# Patient Record
Sex: MALE | Race: WHITE | NOT HISPANIC OR LATINO | Employment: FULL TIME | URBAN - NONMETROPOLITAN AREA
[De-identification: names, ages, dates, MRNs, and addresses within clinical notes are randomized per-mention and may not be internally consistent; named-entity substitution may affect disease eponyms.]

---

## 2019-03-19 ENCOUNTER — OFFICE VISIT (OUTPATIENT)
Dept: SLEEP MEDICINE | Facility: HOSPITAL | Age: 54
End: 2019-03-19

## 2019-03-19 VITALS
SYSTOLIC BLOOD PRESSURE: 155 MMHG | HEIGHT: 69 IN | DIASTOLIC BLOOD PRESSURE: 94 MMHG | WEIGHT: 239 LBS | BODY MASS INDEX: 35.4 KG/M2 | HEART RATE: 82 BPM | OXYGEN SATURATION: 98 %

## 2019-03-19 DIAGNOSIS — G47.33 OBSTRUCTIVE SLEEP APNEA, ADULT: Primary | ICD-10-CM

## 2019-03-19 PROCEDURE — 99203 OFFICE O/P NEW LOW 30 MIN: CPT | Performed by: INTERNAL MEDICINE

## 2019-03-19 NOTE — PROGRESS NOTES
New Patient Sleep Medicine Consultation    Encounter Date: 3/19/2019         Patient's PCP: Anitra Begum APRN  Referring provider: No ref. provider found  Reason for consultation chief complaint: snoring, excessive daytime sleepiness and unrefreshing sleep    Everette Mckeon is a 53 y.o. male who admits to snoring, unrestful sleep, High blod pressure, working night shift or rotataing shifts, stop breathing during sleep, sleeping less than 6 hours per night, Disturbed or restless sleep, Up to the bathroom at night, sleepy driving, restless legs at night, difficulty falling asleep and difficulty staying asleep. He denies cataplexy, sleep paralysis, or hypnagogic hallucinations. His bedtime is ~ 1100. He  falls asleep after 1-2 minutes, and is up 4-5 times per night. He wakes up ~ 1800. He endorses 3-4 hours of sleep. He drinks 1 cups of coffee, 2 teas, and 1 sodas per day. He drinks 0 alcoholic beverages per week. He is not a current smoker. He does not take sedatives or hypnotics. He has some sleepiness with driving. He naps rarely.     [place] - 13    No past medical history on file. 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, GERD  Social History     Socioeconomic History   • Marital status:      Spouse name: Not on file   • Number of children: Not on file   • Years of education: Not on file   • Highest education level: Not on file   Social Needs   • Financial resource strain: Not on file   • Food insecurity - worry: Not on file   • Food insecurity - inability: Not on file   • Transportation needs - medical: Not on file   • Transportation needs - non-medical: Not on file   Occupational History   • Not on file   Tobacco Use   • Smoking status: Never Smoker   • Smokeless tobacco: Never Used   Substance and Sexual Activity   • Alcohol use: No     Frequency: Never   • Drug use: No   • Sexual activity: Not on file   Other Topics Concern   • Not on file   Social History Narrative   • Not  "on file     Family History   Problem Relation Age of Onset   • Hypertension Mother    • Hypertension Father    • Hypertension Brother    , 1 child   - straight 3rd shift  1 brothers and 1 sisters  Other family history + for: HTN  Smoking history: smoked never  FH of sleep disorders: father    Review of Systems:  Constitutional: negative  Eyes: negative  Ears, nose, mouth, throat, and face: negative  Respiratory: negative  Cardiovascular: negative  Gastrointestinal: negative  Genitourinary:negative  Integument/breast: negative  Hematologic/lymphatic: negative  Musculoskeletal:negative  Neurological: negative  Behavioral/Psych: negative  Endocrine: negative  Allergic/Immunologic: negative Patient advised to discuss any positive ROS with PCP.      Vitals:    03/19/19 0802   BP: 155/94   Pulse: 82   SpO2: 98%           03/19/19  0802   Weight: 108 kg (239 lb)       Body mass index is 35.29 kg/m². Patient's Body mass index is 35.29 kg/m². BMI is above normal parameters. Recommendations include: referral to primary care.  Neck circumference: 16.5\"          General: Alert. Cooperative. Well developed. No acute distress.             Head:  Normocephalic. Symmetrical. Atraumatic.              Eyes: Sclera clear. No icterus. PERRLA. Normal EOM.             Ears: No deformities. Normal hearing.             Nose: No septal deviation. No drainage.          Throat: No oral lesions. No thrush. Moist mucous membranes. Trachea midline    Tongue is normal    Dentition is fair with narrow bite, crowding, and torus on mandible under the tongue       Pharynx: Posterior pharyngeal pillars are narrow    Mallampati score of III (soft and hard palate and base of uvula visible)    Pharynx is normal with unrermarkable tonsils   Chest Wall:  Normal shape. Symmetric expansion with respiration. No tenderness.          Lungs:  Clear to auscultation bilaterally. No wheezes. No rhonchi. No rales. Respirations regular, even and " unlabored.            Heart:  Regular rhythm and normal rate. Normal S1 and S2. No murmur.     Abdomen:  Soft, non-tender and non-distended. Normal bowel sounds. No masses.  Extremities:  Moves all extremities well. No edema.           Pulses: Pulses palpable and equal bilaterally.               Skin: Dry. Intact. No bleeding. No rash.           Neuro: Moves all 4 extremities and cranial nerves grossly intact.  Psychiatric: Normal mood and affect.    No current outpatient medications on file.    WBC   Date Value Ref Range Status   09/26/2014 12.3 (H) 3.2 - 9.8 x1000/uL Final     RBC   Date Value Ref Range Status   09/26/2014 4.68 4.37 - 5.74 christiano/mm3 Final     Hemoglobin   Date Value Ref Range Status   09/26/2014 13.7 13.7 - 17.3 gm/dl Final     Hematocrit   Date Value Ref Range Status   09/26/2014 41.4 39.0 - 49.0 % Final     MCV   Date Value Ref Range Status   09/26/2014 88.5 80.0 - 98.0 fl Final     MCH   Date Value Ref Range Status   09/26/2014 29.3 26.0 - 34.0 pg Final     MCHC   Date Value Ref Range Status   09/26/2014 33.1 31.5 - 36.3 gm/dl Final     RDW   Date Value Ref Range Status   09/26/2014 12.9 11.5 - 14.5 % Final     MPV   Date Value Ref Range Status   09/26/2014 12.1 (H) 8.0 - 12.0 fl Final     Platelets   Date Value Ref Range Status   09/26/2014 193 150 - 450 x1000/mm3 Final     Neutrophil Rel %   Date Value Ref Range Status   09/26/2014 82.5 (H) 37.0 - 80.0 % Final     Lymphocyte Rel %   Date Value Ref Range Status   09/26/2014 10.0 10.0 - 50.0 % Final     Monocyte Rel %   Date Value Ref Range Status   09/26/2014 6.6 0.0 - 12.0 % Final     Eosinophil Rel %   Date Value Ref Range Status   09/26/2014 0.6 0.0 - 7.0 % Final     Basophil Rel %   Date Value Ref Range Status   09/26/2014 0.1 0.0 - 2.0 % Final     Immature Granulocyte Rel %   Date Value Ref Range Status   09/26/2014 0.20 0.00 - 0.50 % Final     Neutrophils Absolute   Date Value Ref Range Status   09/26/2014 10.15 (H) 2.00 - 8.60 x1000/uL  Final     Lymphocytes Absolute   Date Value Ref Range Status   09/26/2014 1.23 0.60 - 4.20 x1000/uL Final     Monocytes Absolute   Date Value Ref Range Status   09/26/2014 0.81 0.00 - 0.90 x1000/uL Final     Eosinophils Absolute   Date Value Ref Range Status   09/26/2014 0.07 0.00 - 0.70 x1000/uL Final     Basophils Absolute   Date Value Ref Range Status   09/26/2014 0.01 0.00 - 0.20 x1000/uL Final     Immature Granulocytes Absolute   Date Value Ref Range Status   09/26/2014 0.030 (H) 0.005 - 0.022 x1000/uL Final     Contraindications to home sleep test: none    ASSESSMENT:  1. Obstructive sleep apnea New, further workup planned (4)  1. Check home sleep study   2. Restless sleep  3. Shift Work Sleep Disorder Established, stable (1)   1. Discussed with patient    RTC in 3 months         This document has been electronically signed by Yovani Quinonez MD on March 19, 2019         CC: Anitra Begum APRN          No ref. provider found

## 2019-04-01 ENCOUNTER — HOSPITAL ENCOUNTER (OUTPATIENT)
Dept: SLEEP MEDICINE | Facility: HOSPITAL | Age: 54
Discharge: HOME | End: 2019-04-01
Admitting: INTERNAL MEDICINE

## 2019-04-01 DIAGNOSIS — G47.33 OBSTRUCTIVE SLEEP APNEA, ADULT: ICD-10-CM

## 2019-04-01 PROCEDURE — 95806 SLEEP STUDY UNATT&RESP EFFT: CPT

## 2019-04-01 PROCEDURE — 95806 SLEEP STUDY UNATT&RESP EFFT: CPT | Performed by: INTERNAL MEDICINE

## 2019-04-13 DIAGNOSIS — G47.33 OBSTRUCTIVE SLEEP APNEA, ADULT: Primary | ICD-10-CM

## 2019-04-23 ENCOUNTER — HOSPITAL ENCOUNTER (OUTPATIENT)
Dept: SLEEP MEDICINE | Facility: HOSPITAL | Age: 54
Discharge: HOME | End: 2019-04-23
Admitting: INTERNAL MEDICINE

## 2019-04-23 VITALS — BODY MASS INDEX: 35.55 KG/M2 | WEIGHT: 240 LBS | HEIGHT: 69 IN

## 2019-04-23 DIAGNOSIS — G47.33 OBSTRUCTIVE SLEEP APNEA, ADULT: ICD-10-CM

## 2019-04-23 PROCEDURE — 95811 POLYSOM 6/>YRS CPAP 4/> PARM: CPT | Performed by: INTERNAL MEDICINE

## 2019-04-23 PROCEDURE — 95811 POLYSOM 6/>YRS CPAP 4/> PARM: CPT

## 2019-05-02 DIAGNOSIS — G47.33 OBSTRUCTIVE SLEEP APNEA, ADULT: Primary | ICD-10-CM

## 2021-03-26 DIAGNOSIS — M25.551 RIGHT HIP PAIN: Primary | ICD-10-CM

## 2021-03-29 ENCOUNTER — OFFICE VISIT (OUTPATIENT)
Dept: ORTHOPEDIC SURGERY | Facility: CLINIC | Age: 56
End: 2021-03-29

## 2021-03-29 VITALS — HEIGHT: 69 IN | WEIGHT: 239 LBS | BODY MASS INDEX: 35.4 KG/M2

## 2021-03-29 DIAGNOSIS — M25.551 RIGHT HIP PAIN: Primary | ICD-10-CM

## 2021-03-29 DIAGNOSIS — M16.11 PRIMARY OSTEOARTHRITIS OF RIGHT HIP: ICD-10-CM

## 2021-03-29 PROCEDURE — 96372 THER/PROPH/DIAG INJ SC/IM: CPT | Performed by: NURSE PRACTITIONER

## 2021-03-29 PROCEDURE — 99203 OFFICE O/P NEW LOW 30 MIN: CPT | Performed by: NURSE PRACTITIONER

## 2021-03-29 RX ORDER — KETOROLAC TROMETHAMINE 30 MG/ML
60 INJECTION, SOLUTION INTRAMUSCULAR; INTRAVENOUS EVERY 6 HOURS PRN
Status: SHIPPED | OUTPATIENT
Start: 2021-03-29 | End: 2021-04-03

## 2021-03-29 RX ORDER — TRIAMCINOLONE ACETONIDE 40 MG/ML
80 INJECTION, SUSPENSION INTRA-ARTICULAR; INTRAMUSCULAR ONCE
Status: COMPLETED | OUTPATIENT
Start: 2021-03-29 | End: 2021-03-29

## 2021-03-29 RX ADMIN — TRIAMCINOLONE ACETONIDE 80 MG: 40 INJECTION, SUSPENSION INTRA-ARTICULAR; INTRAMUSCULAR at 15:49

## 2021-03-29 RX ADMIN — KETOROLAC TROMETHAMINE 60 MG: 30 INJECTION, SOLUTION INTRAMUSCULAR; INTRAVENOUS at 15:51

## 2021-03-29 NOTE — PROGRESS NOTES
Everette Mckeon is a 55 y.o. male   Primary provider:  Provider, No Known       Chief Complaint   Patient presents with   • Right Hip - Hip Pain     HISTORY OF PRESENT ILLNESS:    55-year-old male patient presents to office for evaluation of chronic right hip pain.  Onset of pain occurred over 1 year ago with no known injury or incident.  Patient states his pain has continued to progressively worsen.  Patient reports pain with his hip flexed in certain positions while sitting as well as increased pain with longer periods of standing and walking.  Patient works as a .  Pain is described as constant and moderate in severity.  Pain is described as aching in nature.  Pain is worse with sitting, driving and walking.  Patient reports difficulty putting on his socks due to pain and limitations in the right hip joint.  Pain improves mildly with rest, heat therapy and Ibuprofen.  X-rays are performed in office today.  Pain scale today is 3/10.    Hip Pain   The incident occurred more than 1 week ago (about one year ago). There was no injury mechanism. The pain is present in the right hip. The quality of the pain is described as aching. The pain is at a severity of 3/10. The pain is moderate. The pain has been constant since onset. Pertinent negatives include no numbness or tingling. He reports no foreign bodies present. The symptoms are aggravated by weight bearing and movement (sitting, driving, walking). He has tried NSAIDs, heat and rest (xrays done today. ) for the symptoms. The treatment provided mild relief.     CONCURRENT MEDICAL HISTORY:    Past Medical History:   Diagnosis Date   • History of stomach ulcers    • Primary osteoarthritis of right hip 3/29/2021   • Sleep apnea        No Known Allergies      Current Outpatient Medications:   •  diclofenac sodium (VOTAREN XR) 100 MG 24 hr tablet, Take 1 tablet by mouth Daily., Disp: 30 tablet, Rfl: 3    Current Facility-Administered Medications:   •   "ketorolac (TORADOL) injection 60 mg, 60 mg, Intramuscular, Q6H PRN, Fela Kelly, APRN, 60 mg at 03/29/21 1551    Past Surgical History:   Procedure Laterality Date   • GALLBLADDER SURGERY      9-10 years ago   • VASECTOMY      20 years ago       Family History   Problem Relation Age of Onset   • Hypertension Mother    • Hypertension Father    • Hypertension Brother        Social History     Socioeconomic History   • Marital status:      Spouse name: Not on file   • Number of children: Not on file   • Years of education: Not on file   • Highest education level: Not on file   Tobacco Use   • Smoking status: Never Smoker   • Smokeless tobacco: Never Used   Substance and Sexual Activity   • Alcohol use: No   • Drug use: No        Review of Systems   Constitutional: Negative.    HENT: Negative.    Eyes: Negative.    Respiratory: Negative.    Cardiovascular: Negative.    Gastrointestinal: Negative.    Endocrine: Negative.    Genitourinary: Negative.    Musculoskeletal: Positive for arthralgias and gait problem.        Right hip pain.    Skin: Negative.    Allergic/Immunologic: Negative.    Neurological: Negative for tingling and numbness.   Hematological: Negative.    Psychiatric/Behavioral: Negative.        PHYSICAL EXAMINATION:       Ht 175.3 cm (69\")   Wt 108 kg (239 lb)   BMI 35.29 kg/m²     Physical Exam  Vitals reviewed.   Constitutional:       General: He is not in acute distress.     Appearance: He is well-developed. He is not ill-appearing.   HENT:      Head: Normocephalic.   Pulmonary:      Effort: Pulmonary effort is normal. No respiratory distress.   Abdominal:      General: There is no distension.      Palpations: Abdomen is soft.   Musculoskeletal:         General: No swelling, tenderness, deformity or signs of injury.   Skin:     General: Skin is warm and dry.      Capillary Refill: Capillary refill takes less than 2 seconds.      Findings: No erythema.   Neurological:      Mental Status: He is " alert and oriented to person, place, and time.      GCS: GCS eye subscore is 4. GCS verbal subscore is 5. GCS motor subscore is 6.   Psychiatric:         Speech: Speech normal.         Behavior: Behavior normal.         Thought Content: Thought content normal.         Judgment: Judgment normal.         GAIT:     []  Normal  [x]  Antalgic (mild)    Assistive device: [x]  None  []  Walker     []  Crutches  []  Cane     []  Wheelchair  []  Stretcher    Right Hip Exam     Tenderness   The patient is experiencing no tenderness.     Range of Motion   Abduction: 30   Flexion: 100   External rotation: 50   Internal rotation: 5     Muscle Strength   The patient has normal right hip strength.    Tests   DEE: positive  Fadir:  Positive FADIR test    Other   Erythema: absent  Sensation: normal  Pulse: present    Comments:  Pain and limitations with range of motion.  No deformity.  No leg length discrepancy noted.  No tenderness to palpation.  No swelling appreciated.  No erythema.  No warmth.  No signs of infection noted.      Left Hip Exam     Tenderness   The patient is experiencing no tenderness.     Range of Motion   The patient has normal left hip ROM.    Muscle Strength   The patient has normal left hip strength.     Tests   DEE: negative  Fadir:  Negative FADIR test    Other   Erythema: absent  Sensation: normal  Pulse: present            XR Hip With or Without Pelvis 2 - 3 View Right    Result Date: 3/29/2021  Narrative: AP and lateral views of the right hip with an AP view of the pelvis reveal no evidence of acute fracture or dislocation.  There are moderate to severe degenerative changes noted in the right hip joint with diminished joint spacing.  There are subchondral sclerotic changes seen inferiorly at the humeral head and acetabular rim.  There is spurring seen at the inferior aspect of the humeral head.  There is some mild irregularity noted at the inferior aspect of the humeral head but overall remains round  in appearance and well-positioned within the acetabulum.  Joint spacing is well-maintained in the left hip as seen on the AP view of the pelvis with no degenerative changes noted on the left side.  The bony pelvis is intact.  The SI joints are intact and appear symmetrical.  No acute bony radiologic abnormalities are noted at this time.  No comparison images are available for review.03/29/21 at 16:53 CDT by AGNES Hare         ASSESSMENT:    Diagnoses and all orders for this visit:    Right hip pain  -     ketorolac (TORADOL) injection 60 mg  -     triamcinolone acetonide (KENALOG-40) injection 80 mg    Primary osteoarthritis of right hip  -     ketorolac (TORADOL) injection 60 mg  -     triamcinolone acetonide (KENALOG-40) injection 80 mg    Other orders  -     diclofenac sodium (VOTAREN XR) 100 MG 24 hr tablet; Take 1 tablet by mouth Daily.    PLAN    X-rays of the right hip/pelvis performed in office today reviewed with no acute findings noted.  Patient is noted to have moderate to severe degenerative changes in the right hip joint and we have reviewed these x-rays together today and discussed the results.  Patient has experienced progressively worsening right hip pain over the past year with no known injury or incident.  We discussed the likelihood of eventual need for hip arthroplasty. We discussed treatment options available for surgical consideration.  Recommend IM injections of Toradol and Kenalog today for management of pain/inflammation.  Recommend use of a cane for modified weightbearing off the right hip as needed.  Recommend rest and activity modification during times of increased pain.  We discussed trial of an intra-articular injection of steroid if the intramuscular injections are ineffective.    Recommend starting a prescription oral NSAID for improved control of pain/inflammation. Ibuprofen is discontinued and Voltaren XR is prescribed today. Patient is instructed to take the medication  daily. Patient is instructed to take the medication with food to minimize any potential GI upset. Patient is instructed not to take any additional NSAIDs, such as Ibuprofen or Aleve, while taking Voltaren. Patient can also take Tylenol as needed for additional pain control.  Patient can also take Tylenol as needed for additional pain control.  Follow-up in 6 weeks for recheck as needed for any new, worsening or persistent symptoms.  We also discussed that the patient can call the office and notify me if he decides he wants to move forward with trial of an intra-articular injection of steroid, certainly if he does not get sufficient relief with the IM Kenalog a prescription oral NSAID.    EMR Dragon/Transciption Disclaimer: Some of this note may be an electronic transcription/translation of spoken language to printed text.  The electronic translation of spoken language may permit erroneous, or at times, nonsensical words or phrases to be inadvertently transcribed. Although I have reviewed the note for such errors, some may still exist.     Return in about 6 weeks (around 5/10/2021), or if symptoms worsen or fail to improve, for Recheck.        This document has been electronically signed by AGNES Hare on March 30, 2021 21:17 CDT      AGNES Hare

## 2021-08-13 ENCOUNTER — OFFICE VISIT (OUTPATIENT)
Dept: ORTHOPEDIC SURGERY | Facility: CLINIC | Age: 56
End: 2021-08-13

## 2021-08-13 VITALS — WEIGHT: 239 LBS | HEIGHT: 69 IN | BODY MASS INDEX: 35.4 KG/M2

## 2021-08-13 DIAGNOSIS — M25.551 RIGHT HIP PAIN: Primary | ICD-10-CM

## 2021-08-13 DIAGNOSIS — M16.11 PRIMARY OSTEOARTHRITIS OF RIGHT HIP: ICD-10-CM

## 2021-08-13 PROCEDURE — 99213 OFFICE O/P EST LOW 20 MIN: CPT | Performed by: NURSE PRACTITIONER

## 2021-08-13 PROCEDURE — 96372 THER/PROPH/DIAG INJ SC/IM: CPT | Performed by: NURSE PRACTITIONER

## 2021-08-13 RX ORDER — KETOROLAC TROMETHAMINE 30 MG/ML
60 INJECTION, SOLUTION INTRAMUSCULAR; INTRAVENOUS ONCE
Status: COMPLETED | OUTPATIENT
Start: 2021-08-13 | End: 2021-08-13

## 2021-08-13 RX ORDER — TRIAMCINOLONE ACETONIDE 40 MG/ML
80 INJECTION, SUSPENSION INTRA-ARTICULAR; INTRAMUSCULAR ONCE
Status: COMPLETED | OUTPATIENT
Start: 2021-08-13 | End: 2021-08-13

## 2021-08-13 RX ADMIN — KETOROLAC TROMETHAMINE 60 MG: 30 INJECTION, SOLUTION INTRAMUSCULAR; INTRAVENOUS at 14:13

## 2021-08-13 RX ADMIN — TRIAMCINOLONE ACETONIDE 80 MG: 40 INJECTION, SUSPENSION INTRA-ARTICULAR; INTRAMUSCULAR at 14:13

## 2021-08-13 NOTE — PROGRESS NOTES
"Everette Mckeon is a 55 y.o. male returns for     Chief Complaint   Patient presents with   • Right Hip - Follow-up       HISTORY OF PRESENT ILLNESS: Patient presents to office for follow-up of chronic right hip pain. Onset of pain occurred about 1-1/2 years ago with no known injury or incident. Patient was previously diagnosed with osteoarthritis of the right hip based on x-ray imaging, which showed moderate to severe degenerative changes and loss of joint spacing. Patient has experienced right hip/groin pain that has continued to progressively worsen. Patient was last seen in office on 3/29/2021 and given IM injections of Toradol and Kenalog, which she states offered significant pain improvement for several months. Patient was also started on Voltaren XR, which he states has also been beneficial. Patient continues to take Voltaren and is tolerating this medication well with no known adverse effects. Patient reports some gradual return/increase in his right hip pain in recent weeks with no known injury or incident. Patient has increased pain with longer periods of standing and walking. 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 Patient states he has an upcoming vacation and wants to repeat the injections today in an effort to improve his pain as he knows he will be doing more walking on vacation.     CONCURRENT MEDICAL HISTORY:    The following portions of the patient's history were reviewed and updated as appropriate: allergies, current medications, past family history, past medical history, past social history, past surgical history and problem list.     ROS  No fevers or chills.  No chest pain or shortness of air.  No GI or  disturbances. Right hip pain.     PHYSICAL EXAMINATION:       Ht 175.3 cm (69\")   Wt 108 kg (239 lb)   BMI 35.29 kg/m²     Physical Exam  Vitals reviewed. "   Constitutional:       General: He is not in acute distress.     Appearance: He is well-developed. He is not ill-appearing.   HENT:      Head: Normocephalic.   Pulmonary:      Effort: Pulmonary effort is normal. No respiratory distress.   Abdominal:      General: There is no distension.      Palpations: Abdomen is soft.   Musculoskeletal:         General: No swelling, tenderness, deformity or signs of injury.   Skin:     General: Skin is warm and dry.      Capillary Refill: Capillary refill takes less than 2 seconds.      Findings: No erythema.   Neurological:      Mental Status: He is alert and oriented to person, place, and time.      GCS: GCS eye subscore is 4. GCS verbal subscore is 5. GCS motor subscore is 6.   Psychiatric:         Speech: Speech normal.         Behavior: Behavior normal.         Thought Content: Thought content normal.         Judgment: Judgment normal.         GAIT:     [x]  Normal  []  Antalgic    Assistive device: [x]  None  []  Walker     []  Crutches  []  Cane     []  Wheelchair  []  Stretcher    Right Hip Exam     Tenderness   The patient is experiencing no tenderness.     Range of Motion   Abduction: 30   Flexion: 100   External rotation: 50   Internal rotation: 5     Muscle Strength   The patient has normal right hip strength.    Tests   DEE: positive  Fadir:  Positive FADIR test    Other   Erythema: absent  Sensation: normal  Pulse: present    Comments:  Pain and limitations with range of motion.  No deformity.  No leg length discrepancy noted.  No tenderness to palpation.  No swelling appreciated.  No erythema.  No warmth.  No signs of infection noted.            XR Hip With or Without Pelvis 2 - 3 View Right     Result Date: 3/29/2021  Narrative: AP and lateral views of the right hip with an AP view of the pelvis reveal no evidence of acute fracture or dislocation.  There are moderate to severe degenerative changes noted in the right hip joint with diminished joint spacing.   There are subchondral sclerotic changes seen inferiorly at the humeral head and acetabular rim.  There is spurring seen at the inferior aspect of the humeral head.  There is some mild irregularity noted at the inferior aspect of the humeral head but overall remains round in appearance and well-positioned within the acetabulum.  Joint spacing is well-maintained in the left hip as seen on the AP view of the pelvis with no degenerative changes noted on the left side.  The bony pelvis is intact.  The SI joints are intact and appear symmetrical.  No acute bony radiologic abnormalities are noted at this time.  No comparison images are available for review.03/29/21 at 16:53 CDT by AGNES Hare       ASSESSMENT:    Diagnoses and all orders for this visit:    Right hip pain  -     triamcinolone acetonide (KENALOG-40) injection 80 mg  -     ketorolac (TORADOL) injection 60 mg    Primary osteoarthritis of right hip  -     triamcinolone acetonide (KENALOG-40) injection 80 mg  -     ketorolac (TORADOL) injection 60 mg    PLAN    Patient is doing well overall and reports significant improvement following IM injections of Toradol and Kenalog at last office visit in March 2021 and starting a prescription oral NSAID. Patient has known moderate to severe osteoarthritic changes in the right hip joint. Patient has an upcoming vacation and is concerned that his right hip pain will increase even further with longer periods of weightbearing activity and walking. Recommend repeat IM injections today of Toradol and Kenalog. We discussed that if these do not offer sufficient pain relief, patient can notify me and I will order an intra-articular injection of steroid, which would be intended to relieve pain/inflammation related to osteoarthritis in his right hip joint. Patient had good pain improvement previously with intramuscular injection so we will try to repeat these again.    Recommend rest and activity modification during times  of increased pain. Recommend use of a cane for modified weightbearing of the right hip during times of increased pain. Recommend to continue with Voltaren XR daily for consistent dosing of oral NSAIDs. Patient is tolerating this medication well with no known adverse effects. Patient is reminded not to take any additional OTC oral NSAIDs while taking Voltaren. Patient can also take Tylenol as needed for additional pain control. Follow-up in 6 weeks for recheck as needed for any new, worsening or persistent symptoms. We also discussed that the patient can call the office and notify me if he decides he wants to proceed with a trial of an intra-articular injection of steroid into the right hip joint, which he understands will be performed at the hospital by the radiologist.    EMR Dragon/Transciption Disclaimer: Some of this note may be an electronic transcription/translation of spoken language to printed text.  The electronic translation of spoken language may permit erroneous, or at times, nonsensical words or phrases to be inadvertently transcribed. Although I have reviewed the note for such errors, some may still exist.     Return in about 6 weeks (around 9/24/2021) for Recheck.        This document has been electronically signed by AGNES Hare on August 15, 2021 10:36 CDT      AGNES Hare

## 2021-09-24 DIAGNOSIS — M79.605 LEFT LEG PAIN: ICD-10-CM

## 2021-09-24 DIAGNOSIS — M25.562 ACUTE PAIN OF LEFT KNEE: Primary | ICD-10-CM

## 2021-09-24 DIAGNOSIS — M25.552 LEFT HIP PAIN: Primary | ICD-10-CM

## 2021-09-27 ENCOUNTER — OFFICE VISIT (OUTPATIENT)
Dept: ORTHOPEDIC SURGERY | Facility: CLINIC | Age: 56
End: 2021-09-27

## 2021-09-27 VITALS — BODY MASS INDEX: 32.44 KG/M2 | HEIGHT: 69 IN | WEIGHT: 219 LBS

## 2021-09-27 DIAGNOSIS — M79.605 LEFT LEG PAIN: ICD-10-CM

## 2021-09-27 DIAGNOSIS — M41.50 DEGENERATIVE SCOLIOSIS: ICD-10-CM

## 2021-09-27 DIAGNOSIS — R20.0 NUMBNESS AND TINGLING OF LEFT LEG: ICD-10-CM

## 2021-09-27 DIAGNOSIS — M54.16 LUMBAR RADICULOPATHY: ICD-10-CM

## 2021-09-27 DIAGNOSIS — M25.552 LEFT HIP PAIN: Primary | ICD-10-CM

## 2021-09-27 DIAGNOSIS — R20.2 NUMBNESS AND TINGLING OF LEFT LEG: ICD-10-CM

## 2021-09-27 DIAGNOSIS — M51.36 DDD (DEGENERATIVE DISC DISEASE), LUMBAR: ICD-10-CM

## 2021-09-27 PROCEDURE — 99214 OFFICE O/P EST MOD 30 MIN: CPT | Performed by: NURSE PRACTITIONER

## 2021-09-27 RX ORDER — HYDROCODONE BITARTRATE AND ACETAMINOPHEN 5; 325 MG/1; MG/1
1 TABLET ORAL EVERY 8 HOURS PRN
Qty: 30 TABLET | Refills: 0 | Status: SHIPPED | OUTPATIENT
Start: 2021-09-27 | End: 2021-10-07

## 2021-09-27 RX ORDER — METHOCARBAMOL 750 MG/1
750 TABLET, FILM COATED ORAL 4 TIMES DAILY PRN
COMMUNITY
End: 2021-12-04

## 2021-09-27 NOTE — PROGRESS NOTES
Answers for HPI/ROS submitted by the patient on 9/20/2021  What is the primary reason for your visit?: Other  Please describe your symptoms.: Pain in left hip and running down lower left leg  Have you had these symptoms before?: No  How long have you been having these symptoms?: Greater than 2 weeks    Everette Mckeon is a 56 y.o. male   Primary provider:  Provider, No Known       Chief Complaint   Patient presents with   • Left Hip - Hip Pain     HISTORY OF PRESENT ILLNESS:    56-year-old male patient presents to office for evaluation of acute low back and left hip pain with pain that radiates down the length of his left leg to the level of the left foot with numbness and tingling symptoms occurring intermittently.  Onset of pain occurred on 8/20/2021 after bending over at work.  Patient did not sustain any specific injury.  Patient localizes his pain primarily in the left posterior hip and buttock and throughout the length of the left leg.  Patient was initially evaluated at [facility] urgent care in [place] on 8/22/2021 and given IM injections of Toradol and Kenalog and prescribed Zanaflex.  [facility] [place] [facility] performed and prescribed Robaxin and a Medrol Dosepak.  Patient reports that his pain has mildly and gradually been improving.  Patient states his pain was much better yesterday but he experienced pain again today.  Pain is described as constant and moderate in severity.  Pain is described as aching in nature with associated popping sensations and numbness/tingling in the left leg.  Pain is worse with sitting, standing and walking.  Pain improves mildly with rest, muscle relaxants, oral steroids, anti-inflammatory medications and IM injections of Toradol and Kenalog.  Pain scale today is 5/10.    Hip Pain   The incident occurred more than 1 week ago (8/22/2021). There was no injury mechanism. The pain is present in the left hip and  left leg. The quality of the pain is described as aching. The pain is at a severity of 5/10. The pain is moderate. The pain has been constant since onset. Associated symptoms include numbness (LLE). Associated symptoms comments: Popping sensations. He reports no foreign bodies present. The symptoms are aggravated by movement and weight bearing (Sitting, standing, walking). He has tried rest and NSAIDs (IM Toradol, IM Kenalog, muscle relaxants (Robaxin, Zanaflex), a Medrol Dosepak) for the symptoms. The treatment provided mild relief.     CONCURRENT MEDICAL HISTORY:    Past Medical History:   Diagnosis Date   • Degenerative scoliosis    • History of stomach ulcers    • Primary osteoarthritis of right hip 3/29/2021   • Sleep apnea        No Known Allergies      Current Outpatient Medications:   •  diclofenac sodium (VOTAREN XR) 100 MG 24 hr tablet, Take 1 tablet by mouth Daily., Disp: 30 tablet, Rfl: 3  •  methocarbamol (ROBAXIN) 750 MG tablet, Take 750 mg by mouth 4 (Four) Times a Day As Needed for Muscle Spasms., Disp: , Rfl:   •  HYDROcodone-acetaminophen (NORCO) 5-325 MG per tablet, Take 1 tablet by mouth Every 8 (Eight) Hours As Needed for Moderate Pain  or Severe Pain  for up to 10 days., Disp: 30 tablet, Rfl: 0    Past Surgical History:   Procedure Laterality Date   • GALLBLADDER SURGERY      9-10 years ago   • HAND SURGERY  2000    Partial amputation finger   • KNEE SURGERY  1985   • VASECTOMY      20 years ago       Family History   Problem Relation Age of Onset   • Hypertension Mother    • Hypertension Father    • Hypertension Brother        Social History     Socioeconomic History   • Marital status:      Spouse name: Not on file   • Number of children: Not on file   • Years of education: Not on file   • Highest education level: Not on file   Tobacco Use   • Smoking status: Never Smoker   • Smokeless tobacco: Never Used   Substance and Sexual Activity   • Alcohol use: No   • Drug use: No   • Sexual  "activity: Yes     Partners: Female     Comment: Vasectomy        Review of Systems   Constitutional: Positive for activity change.   HENT: Negative.    Eyes: Negative.    Respiratory: Negative.    Cardiovascular: Negative.    Gastrointestinal: Negative.    Endocrine: Negative.    Genitourinary: Negative.    Musculoskeletal: Positive for arthralgias, back pain and gait problem.        Left hip/leg pain.   Skin: Negative.    Allergic/Immunologic: Negative.    Neurological: Positive for numbness (LLE).        Tingling   Hematological: Negative.    Psychiatric/Behavioral: Negative.        PHYSICAL EXAMINATION:       Ht 175.3 cm (69\")   Wt 99.3 kg (219 lb)   BMI 32.34 kg/m²     Physical Exam  Vitals reviewed.   Constitutional:       General: He is not in acute distress.     Appearance: He is well-developed. He is not ill-appearing.   HENT:      Head: Normocephalic.   Pulmonary:      Effort: Pulmonary effort is normal. No respiratory distress.   Abdominal:      General: There is no distension.      Palpations: Abdomen is soft.   Musculoskeletal:         General: Tenderness (Mild, lumbar spine) present. No swelling, deformity or signs of injury.      Lumbar back: Positive left straight leg raise test. Negative right straight leg raise test.   Skin:     General: Skin is warm and dry.      Capillary Refill: Capillary refill takes less than 2 seconds.      Findings: No erythema.   Neurological:      Mental Status: He is alert and oriented to person, place, and time.      GCS: GCS eye subscore is 4. GCS verbal subscore is 5. GCS motor subscore is 6.   Psychiatric:         Speech: Speech normal.         Behavior: Behavior normal.         Thought Content: Thought content normal.         Judgment: Judgment normal.         GAIT:     []  Normal  [x]  Antalgic (mild)    Assistive device: [x]  None  []  Walker     []  Crutches  []  Cane     []  Wheelchair  []  Stretcher    Left Hip Exam     Tenderness   The patient is experiencing " no tenderness.     Range of Motion   The patient has normal left hip ROM.    Muscle Strength   The patient has normal left hip strength.     Tests   DEE: negative  Fadir:  Negative FADIR test    Other   Erythema: absent  Sensation: normal  Pulse: present      Back Exam     Tenderness   The patient is experiencing tenderness in the lumbar (Mild).    Range of Motion   Extension: abnormal   Flexion: abnormal   Rotation right: abnormal   Rotation left: abnormal     Muscle Strength   The patient has normal back strength.    Tests   Straight leg raise right: negative  Straight leg raise left: positive at 80 deg    Reflexes   Patellar: normal    Other   Sensation: decreased  Gait: antalgic (Mild)   Erythema: no back redness            XR LUMBAR SPINE MIN 4 VIEWS    Narrative    X-ray of the lumbar spine taken 9/18/2021 show degenerative scoliosis. He does have good disc space height through the lumbar vertebra until we get to L5-S1 and we see very narrowed disc space.      Date: 09/23/21   Received From: [facility]        XR Hip With or Without Pelvis 2 - 3 View Left    Result Date: 9/27/2021  Narrative: Two view left hip with single view pelvis HISTORY: Left hip pain AP and frog-leg lateral views of the left hip and AP film of the pelvis obtained. COMPARISON: Pelvis March 29, 2021 FINDINGS: No fracture or dislocation. Normal left hip. Degenerative changes right hip with some joint space narrowing and small osteophytes. Mild degenerative change each iliac crest. Degenerative disc disease L3-4. No other osseous or articular abnormality.     Impression: CONCLUSION: Normal left hip. Degenerative changes right hip with some joint space narrowing and small osteophytes. Mild degenerative change each iliac crest. Degenerative disc disease L3-4. [id number] Electronically signed by:  Eloy Gregg MD  9/27/2021 11:00 PM CDT Workstation: [employer]        ASSESSMENT:    Diagnoses and all orders for this visit:    Left  hip pain  -     HYDROcodone-acetaminophen (NORCO) 5-325 MG per tablet; Take 1 tablet by mouth Every 8 (Eight) Hours As Needed for Moderate Pain  or Severe Pain  for up to 10 days.  -     MRI Lumbar Spine Without Contrast; Future    Lumbar radiculopathy  -     HYDROcodone-acetaminophen (NORCO) 5-325 MG per tablet; Take 1 tablet by mouth Every 8 (Eight) Hours As Needed for Moderate Pain  or Severe Pain  for up to 10 days.  -     MRI Lumbar Spine Without Contrast; Future    Left leg pain  -     HYDROcodone-acetaminophen (NORCO) 5-325 MG per tablet; Take 1 tablet by mouth Every 8 (Eight) Hours As Needed for Moderate Pain  or Severe Pain  for up to 10 days.  -     MRI Lumbar Spine Without Contrast; Future    DDD (degenerative disc disease), lumbar  -     HYDROcodone-acetaminophen (NORCO) 5-325 MG per tablet; Take 1 tablet by mouth Every 8 (Eight) Hours As Needed for Moderate Pain  or Severe Pain  for up to 10 days.  -     MRI Lumbar Spine Without Contrast; Future    Degenerative scoliosis  -     HYDROcodone-acetaminophen (NORCO) 5-325 MG per tablet; Take 1 tablet by mouth Every 8 (Eight) Hours As Needed for Moderate Pain  or Severe Pain  for up to 10 days.  -     MRI Lumbar Spine Without Contrast; Future    Numbness and tingling of left leg  -     MRI Lumbar Spine Without Contrast; Future    PLAN    X-rays of the left hip/pelvis performed in office today reviewed with no acute findings noted.  No degenerative changes are noted in the left hip joint.  There is redemonstration of osteoarthritic changes in the right hip on the pelvic view.  Patient previously had x-rays of the lumbar spine on 9/23/2021 at [facility] [place].  I have reviewed the results of these x-rays today but the images are unavailable for me to review.  Subjective complaints and physical exam are consistent with lumbar radiculopathy.  Onset of pain occurred approximately 3 to 4 weeks ago with no particular injury.  Patient continues to  have persistent symptoms that hav not improved with multiple conservative treatment efforts as described in the HPI above.  Recommend MRI of the lumbar spine to evaluate for disc herniation, spinal stenosis, foraminal stenosis and/or nerve compression.    Recommend the following:    -Rest and activity modification for now with avoidance of straining, lifting, pulling, tugging, etc.  -Use of ice therapy and/or heat therapy to the low back to minimize pain/inflammation/muscle tension.  -Use of a cane or walker for modified weightbearing from the lumbar spine.    Norco is prescribed to take as needed for moderate to severe pain.  Patient is instructed to take the pain medication sparingly and only when needed.Patient is instructed to take the least amount of pain medication needed to control the pain. Patient is cautioned that opioids can be addictive.  We discussed other potential adverse side effects of opioids including constipation, dizziness, drowsiness, increased risk for falls and/or respiratory depression.  Patient verbalized understanding of these risks.  I have encouraged the patient to focus on nonopioid pain management methods as much as possible.  DIAMOND is reviewed internally via Epic and is noted to be appropriate.  Recommend Tylenol as needed for milder pain.  Patient has Voltaren XR to take for management of pain/inflammation that was previously prescribed for his chronic right hip pain.  Patient also has recently prescribed muscle relaxants to take at home if these offer him any improvement in pain/muscle spasms.  No additional steroids are administered today as the patient has had recent IM injection of Kenalog as well as a Medrol Dosepak.    Follow-up after MRI to discuss results and further treatment recommendations.  Consider trial of a lumbar epidural injection of steroid.  Consider surgical consult/referral to neurosurgery if indicated.  Physical therapy referral would be a treatment option as  well.    EMR Dragon/Transciption Disclaimer: Some of this note may be an electronic transcription/translation of spoken language to printed text.  The electronic translation of spoken language may permit erroneous, or at times, nonsensical words or phrases to be inadvertently transcribed. Although I have reviewed the note for such errors, some may still exist.     Return for follow up after MRI.        This document has been electronically signed by AGNES Hare on September 29, 2021 15:51 CDT      AGNES Hare

## 2021-09-29 PROBLEM — M79.605 LEFT LEG PAIN: Status: ACTIVE | Noted: 2021-09-29

## 2021-09-29 PROBLEM — M54.16 LUMBAR RADICULOPATHY: Status: ACTIVE | Noted: 2021-09-29

## 2021-09-29 PROBLEM — M51.36 DDD (DEGENERATIVE DISC DISEASE), LUMBAR: Status: ACTIVE | Noted: 2021-09-29

## 2021-09-29 PROBLEM — R20.0 NUMBNESS AND TINGLING OF LEFT LEG: Status: ACTIVE | Noted: 2021-09-29

## 2021-09-29 PROBLEM — R20.2 NUMBNESS AND TINGLING OF LEFT LEG: Status: ACTIVE | Noted: 2021-09-29

## 2021-09-29 PROBLEM — M41.50 DEGENERATIVE SCOLIOSIS: Status: ACTIVE | Noted: 2021-09-29

## 2021-10-07 ENCOUNTER — HOSPITAL ENCOUNTER (OUTPATIENT)
Dept: MRI IMAGING | Facility: HOSPITAL | Age: 56
Discharge: HOME | End: 2021-10-07
Admitting: NURSE PRACTITIONER

## 2021-10-07 DIAGNOSIS — M54.16 LUMBAR RADICULOPATHY: ICD-10-CM

## 2021-10-07 DIAGNOSIS — R20.0 NUMBNESS AND TINGLING OF LEFT LEG: ICD-10-CM

## 2021-10-07 DIAGNOSIS — R20.2 NUMBNESS AND TINGLING OF LEFT LEG: ICD-10-CM

## 2021-10-07 DIAGNOSIS — M79.605 LEFT LEG PAIN: ICD-10-CM

## 2021-10-07 DIAGNOSIS — M25.552 LEFT HIP PAIN: ICD-10-CM

## 2021-10-07 DIAGNOSIS — M51.36 DDD (DEGENERATIVE DISC DISEASE), LUMBAR: ICD-10-CM

## 2021-10-07 DIAGNOSIS — M41.50 DEGENERATIVE SCOLIOSIS: ICD-10-CM

## 2021-10-07 PROCEDURE — 72148 MRI LUMBAR SPINE W/O DYE: CPT

## 2021-10-12 ENCOUNTER — OFFICE VISIT (OUTPATIENT)
Dept: SLEEP MEDICINE | Facility: HOSPITAL | Age: 56
End: 2021-10-12

## 2021-10-12 VITALS
BODY MASS INDEX: 32.58 KG/M2 | OXYGEN SATURATION: 98 % | HEART RATE: 77 BPM | WEIGHT: 220 LBS | HEIGHT: 69 IN | SYSTOLIC BLOOD PRESSURE: 135 MMHG | DIASTOLIC BLOOD PRESSURE: 77 MMHG

## 2021-10-12 DIAGNOSIS — G47.33 OBSTRUCTIVE SLEEP APNEA, ADULT: Primary | ICD-10-CM

## 2021-10-12 PROCEDURE — 99212 OFFICE O/P EST SF 10 MIN: CPT | Performed by: NURSE PRACTITIONER

## 2021-10-12 RX ORDER — HYDROCODONE BITARTRATE AND ACETAMINOPHEN 5; 325 MG/1; MG/1
1 TABLET ORAL EVERY 8 HOURS PRN
COMMUNITY
Start: 2021-10-08 | End: 2021-10-17

## 2021-10-12 RX ORDER — LISINOPRIL 10 MG/1
TABLET ORAL
COMMUNITY
Start: 2021-09-18

## 2021-10-12 RX ORDER — TIZANIDINE 4 MG/1
TABLET ORAL
COMMUNITY
Start: 2021-08-22 | End: 2021-10-12 | Stop reason: ALTCHOICE

## 2021-10-12 NOTE — PATIENT INSTRUCTIONS
*Call [employer] to register machine's serial number: [id number]  *[employer]' website: [email or web address]

## 2021-10-12 NOTE — PROGRESS NOTES
Sleep Clinic Follow Up    Date: 10/12/2021  Primary Care Provider: Provider, No Known    Last office visit: 2019 (I reviewed this note)    CC: Follow up: SHANNEN on CPAP      Interim History:  Since the last visit:    1) severe SHANNEN -  Everette Mckeon has remained compliant with CPAP. He denies mask and machine issues, dry mouth, headaches, or pressures intolerance. He denies abnormal dreams, sleep paralysis, nasal congestion, URI sx.    2) Patient denies RLS symptoms.     Sleep Testin. HST on 2019, AHI of 64.3   2. CPAP titration on 2019, recommended 10-20 cm H2O   3. Currently on 10-20 cm H2O    PAP Data:    Time frame: 10/15/2020-10/11/2021   Compliance: 95.3%  Average use on days used: 5 hrs 2 min  Percent of days with usage greater than or equal to 4 hours: 68.5%  PAP range: 10-20 cm H2O  Average 90% pressure: 11 cmH2O  Leak: 0 minutes  Average AHI: 0.9 events/hr  Mask type: Nasal mask  DME: [facility]    Bed time: 0900  Sleep latency: 20 minutes  Number of times awakens during the night: 1  Wake time: 1400  Estimated total sleep time at night: 4-5 hours  Caffeine intake: 2 cups of coffee, 1 cups of tea, and 0 sodas per day  Alcohol intake: 0 drinks per week  Nap time: rare   Sleepiness with Driving: none   *works 3rd shift    [place] - 6    PMHx, FH, SH reviewed and pertinent changes are: Reportedly unchanged from last office visit with us.      REVIEW OF SYSTEMS:   Negative for chest pain, SOA, fever, chills, cough, N/V/D, abdominal pain.    Smoking:none    Everette Mckeon  reports that he has never smoked. He has never used smokeless tobacco.    Exam:  Vitals:    10/12/21 0802   BP: 135/77   Pulse: 77   SpO2: 98%           10/12/21  08   Weight: 99.8 kg (220 lb)     Body mass index is 32.47 kg/m². Patient's Body mass index is 32.47 kg/m². indicating that he is obese (BMI >30). Obesity-related health conditions include the following: obstructive sleep apnea, hypertension and  osteoarthritis. Obesity is unchanged. BMI is is above average; BMI management plan is completed. I recommend portion control and increasing exercise.      Gen:                No distress, conversant, pleasant, appears stated age, alert, oriented  Eyes:               Anicteric sclera, moist conjunctiva, no lid lag                           PERRL, EOMI   Heent:             NC/AT                          Oropharynx clear                          Normal hearing  Lungs:             Normal effort, non-labored breathing                          Clear to auscultation bilaterally          CV:                  Normal S1/S2, no murmur                          No lower extremity edema  ABD:               Soft, rounded, non-distended                          Normal bowel sounds                    Psych:             Appropriate affect  Neuro:             CN 2-12 appear intact    Past Medical History:   Diagnosis Date   • Degenerative scoliosis    • History of stomach ulcers    • Primary osteoarthritis of right hip 3/29/2021   • Sleep apnea        Current Outpatient Medications:   •  diclofenac sodium (VOTAREN XR) 100 MG 24 hr tablet, Take 1 tablet by mouth Daily., Disp: 30 tablet, Rfl: 3  •  HYDROcodone-acetaminophen (NORCO) 5-325 MG per tablet, Take 1 tablet by mouth Every 8 (Eight) Hours As Needed. for pain, Disp: , Rfl:   •  lisinopril (PRINIVIL,ZESTRIL) 10 MG tablet, , Disp: , Rfl:   •  methocarbamol (ROBAXIN) 750 MG tablet, Take 750 mg by mouth 4 (Four) Times a Day As Needed for Muscle Spasms., Disp: , Rfl:     WBC   Date Value Ref Range Status   09/26/2014 12.3 (H) 3.2 - 9.8 x1000/uL Final     RBC   Date Value Ref Range Status   09/26/2014 4.68 4.37 - 5.74 christiano/mm3 Final     Hemoglobin   Date Value Ref Range Status   09/26/2014 13.7 13.7 - 17.3 gm/dl Final     Hematocrit   Date Value Ref Range Status   09/26/2014 41.4 39.0 - 49.0 % Final     MCV   Date Value Ref Range Status   09/26/2014 88.5 80.0 - 98.0 fl Final     MCH    Date Value Ref Range Status   09/26/2014 29.3 26.0 - 34.0 pg Final     MCHC   Date Value Ref Range Status   09/26/2014 33.1 31.5 - 36.3 gm/dl Final     RDW   Date Value Ref Range Status   09/26/2014 12.9 11.5 - 14.5 % Final     MPV   Date Value Ref Range Status   09/26/2014 12.1 (H) 8.0 - 12.0 fl Final     Platelets   Date Value Ref Range Status   09/26/2014 193 150 - 450 x1000/mm3 Final     Neutrophil Rel %   Date Value Ref Range Status   09/26/2014 82.5 (H) 37.0 - 80.0 % Final     Lymphocyte Rel %   Date Value Ref Range Status   09/26/2014 10.0 10.0 - 50.0 % Final     Monocyte Rel %   Date Value Ref Range Status   09/26/2014 6.6 0.0 - 12.0 % Final     Eosinophil Rel %   Date Value Ref Range Status   09/26/2014 0.6 0.0 - 7.0 % Final     Basophil Rel %   Date Value Ref Range Status   09/26/2014 0.1 0.0 - 2.0 % Final     Immature Granulocyte Rel %   Date Value Ref Range Status   09/26/2014 0.20 0.00 - 0.50 % Final     Neutrophils Absolute   Date Value Ref Range Status   09/26/2014 10.15 (H) 2.00 - 8.60 x1000/uL Final     Lymphocytes Absolute   Date Value Ref Range Status   09/26/2014 1.23 0.60 - 4.20 x1000/uL Final     Monocytes Absolute   Date Value Ref Range Status   09/26/2014 0.81 0.00 - 0.90 x1000/uL Final     Eosinophils Absolute   Date Value Ref Range Status   09/26/2014 0.07 0.00 - 0.70 x1000/uL Final     Basophils Absolute   Date Value Ref Range Status   09/26/2014 0.01 0.00 - 0.20 x1000/uL Final     Immature Granulocytes Absolute   Date Value Ref Range Status   09/26/2014 0.030 (H) 0.005 - 0.022 x1000/uL Final       Lab Results   Component Value Date    GLUCOSE 103 (H) 09/26/2014    BUN 14 09/26/2014    CREATININE 1.0 09/26/2014    K 4.0 09/26/2014    CO2 32 (H) 09/26/2014    CALCIUM 8.8 09/26/2014    ALBUMIN 3.9 09/26/2014    AST 20 09/26/2014    ALT 32 09/26/2014       Assessment and Plan:    1. Obstructive sleep apnea - Established, stable (1)  1. Compliant with PAP therapy  2. Advised patient of new  safety recall of [employer] CPAP/BiPAP/AVAPS machines. We discussed the risk versus benefit of continuing usage of PAP therapy. The company has recommended that patients stop usage of their current machines. Instructed patient to call [employer] ([phone]) to check if the machine is under warranty for repair or replacement. [place] machine with serial number on website: [email or web address]."[email or web address][email or web address]. Follow up with DME company regarding any further questions, or for consideration for new machine once eligible. Advised patient to avoid unapproved ozone-type CPAP . Advised to call us if any further questions or problems.  3. Script for PAP supplies  4. Return to clinic in 1 year with compliance report unless change in symptoms in interim period       I spent 15 minutes caring for Everette on this date of service. This time includes time spent by me in the following activities: preparing for the visit, reviewing tests, obtaining and/or reviewing a separately obtained history, performing a medically appropriate examination and/or evaluation , counseling and educating the patient/family/caregiver and documenting information in the medical record; discussing PAP therapy, PAP compliance and PAP maintenance    RTC in 12 months. Patient agrees to return sooner if changes in symptoms.        This document has been electronically signed by AGNES Bello on October 12, 2021 08:17 CDT          CC: Provider, No Known          No ref. provider found

## 2021-10-14 ENCOUNTER — OFFICE VISIT (OUTPATIENT)
Dept: ORTHOPEDIC SURGERY | Facility: CLINIC | Age: 56
End: 2021-10-14

## 2021-10-14 VITALS — BODY MASS INDEX: 32.44 KG/M2 | HEIGHT: 69 IN | WEIGHT: 219 LBS

## 2021-10-14 DIAGNOSIS — M79.605 LEFT LEG PAIN: ICD-10-CM

## 2021-10-14 DIAGNOSIS — M25.552 LEFT HIP PAIN: Primary | ICD-10-CM

## 2021-10-14 DIAGNOSIS — M41.50 DEGENERATIVE SCOLIOSIS: ICD-10-CM

## 2021-10-14 DIAGNOSIS — M51.36 DDD (DEGENERATIVE DISC DISEASE), LUMBAR: ICD-10-CM

## 2021-10-14 DIAGNOSIS — R20.0 NUMBNESS AND TINGLING OF LEFT LEG: ICD-10-CM

## 2021-10-14 DIAGNOSIS — R20.2 NUMBNESS AND TINGLING OF LEFT LEG: ICD-10-CM

## 2021-10-14 DIAGNOSIS — M54.16 LUMBAR RADICULOPATHY: ICD-10-CM

## 2021-10-14 PROCEDURE — 99213 OFFICE O/P EST LOW 20 MIN: CPT | Performed by: NURSE PRACTITIONER

## 2021-10-14 NOTE — PROGRESS NOTES
"Everette Mckeon is a 56 y.o. male returns for     Chief Complaint   Patient presents with   • Lumbar Spine - Follow-up, Pain   • Results     mri done on 10/7/2021       HISTORY OF PRESENT ILLNESS: Patient presents to office for follow-up of acute low back pain and left hip pain and to discuss MRI results of lumbar spine.  Onset of pain occurred on 8/20/2021 after bending over at work.  The patient did not sustain any specific injury.  Patient has been experiencing localized pain in the posterior aspect of his left hip/buttock that radiated throughout the length of his left leg down to his left foot with associated numbness and tingling symptoms.  Patient has been treated conservatively with IM injections of Toradol and Kenalog, a Medrol Dosepak, prescription oral NSAIDs (Voltaren XR), muscle relaxants (Robaxin) and pain medication (Norco).  Patient reports some gradual improvement in his pain/symptoms since last office visit.  Patient continues to experience left-sided low back pain and posterior left hip pain that worsens intermittently depending on activity and positions.  No new complaints or concerns noted since last office visit.  No falls or injuries reported.  Pain scale today is 3/10.     CONCURRENT MEDICAL HISTORY:    The following portions of the patient's history were reviewed and updated as appropriate: allergies, current medications, past family history, past medical history, past social history, past surgical history and problem list.     ROS  No fevers or chills.  No chest pain or shortness of air.  No GI or  disturbances.  Low back pain.  Left hip/leg pain.    PHYSICAL EXAMINATION:       Ht 175.3 cm (69\")   Wt 99.3 kg (219 lb)   BMI 32.34 kg/m²     Physical Exam  Vitals reviewed.   Constitutional:       General: He is not in acute distress.     Appearance: He is well-developed. He is not ill-appearing.   HENT:      Head: Normocephalic.   Pulmonary:      Effort: Pulmonary effort is normal. No " respiratory distress.   Abdominal:      General: There is no distension.      Palpations: Abdomen is soft.   Musculoskeletal:         General: Tenderness (Mild, lumbar spine) present. No swelling, deformity or signs of injury.      Lumbar back: Positive left straight leg raise test. Negative right straight leg raise test.   Skin:     General: Skin is warm and dry.      Capillary Refill: Capillary refill takes less than 2 seconds.      Findings: No erythema.   Neurological:      Mental Status: He is alert and oriented to person, place, and time.      GCS: GCS eye subscore is 4. GCS verbal subscore is 5. GCS motor subscore is 6.   Psychiatric:         Speech: Speech normal.         Behavior: Behavior normal.         Thought Content: Thought content normal.         Judgment: Judgment normal.         GAIT:     []  Normal  [x]  Antalgic (mild)    Assistive device: [x]  None  []  Walker     []  Crutches  []  Cane     []  Wheelchair  []  Stretcher    Left Hip Exam     Tenderness   The patient is experiencing no tenderness.     Range of Motion   The patient has normal left hip ROM.    Muscle Strength   The patient has normal left hip strength.     Tests   DEE: negative  Fadir:  Negative FADIR test    Other   Erythema: absent  Sensation: normal  Pulse: present      Back Exam     Tenderness   The patient is experiencing tenderness in the lumbar (Mild).    Range of Motion   Extension: abnormal   Flexion: abnormal   Rotation right: abnormal   Rotation left: abnormal     Muscle Strength   The patient has normal back strength.    Tests   Straight leg raise right: negative  Straight leg raise left: positive at 80 deg    Reflexes   Patellar: normal    Other   Sensation: decreased  Gait: antalgic (Mild)   Erythema: no back redness              MRI Lumbar Spine Without Contrast    Result Date: 10/7/2021  Narrative: MRI LUMBAR SPINE CLINICAL HISTORY:  56 years Male,Low back pain with left hip/buttock pain radiating down left leg  with numbness and tingling?evaluate for nerve compression/lumbar radiculopathy; no injury., M25.552 Pain in left hip M54.16 Radiculopathy, lumbar region M79.605 Pain in left leg M51.36 Other intervertebral disc degeneration, lumbar region M41.80 Other forms of scoliosis, site unspecified R20.0 Anesthesia of skin R20.2 Paresthesia of COMPARISON:  None TECHNIQUE: Sagittal and axial images were obtained without intravenous contrast. FINDINGS: Mild, grade I anterolisthesis of L3 on L4. Grade I retrolisthesis of L5 on S1. No acute fracture. No suspicious bony lesion in the lumbar spine. Signal in the visualized cord is normal. L1-2: Broad-based posterior lateral disc bulging. Mild hypertrophic facet degenerative changes. These findings produce mild spinal canal stenosis as well as mild bilateral neuroforaminal stenosis with partial effacement of the inferior neural foramen. L2-3: Broad-based posterior and lateral disc bulging, slightly asymmetric to the right. Bilateral hypertrophic facet degenerative changes, right also slightly worse than left. These findings produce mild spinal canal stenosis as well as mild/moderate right and mild left neuroforaminal stenosis. There appears to be dorsal abutment of the exiting right nerve root by the hypertrophic facet degenerative change. L3-4: Grade I anterolisthesis of L3 on L4. Broad-based posterior lateral disc bulging. Moderate bilateral facet degenerative changes with ligament flavum hypertrophy. These findings produce moderate/severe spinal canal stenosis related to both the disc bulging and ligamentum flavum hypertrophy/facet degenerative change. There is also moderate bilateral neuroforaminal stenosis, right worse than left with likely abutment of the exiting nerve roots. L4-5: Broad-based posterior and lateral disc bulging with a small left posterior paracentral disc extrusion extending slightly inferiorly along the superior margin of the L5 vertebral body. Bilateral  hypertrophic facet degenerative changes. There is mild spinal canal stenosis with effacement of the left lateral recess, related to both the disc extrusion and the facet degenerative changes. This exerts mass effect upon the left traversing nerve root. There is also mild bilateral neuroforaminal stenosis. L5-S1: Grade I retrolisthesis of L5 on S1. Broad-based posterior and lateral disc bulging. Bilateral facet degenerative changes. These findings produce partial effacement of the left greater than right lateral recess as well as mild bilateral neuroforaminal stenosis. The visualized portions of retroperitoneum are grossly unremarkable.     Impression: 1. Degenerative changes at L3-4 produce moderate/severe spinal canal stenosis as well as moderate bilateral neuroforaminal stenosis. 2. A left posterior paracentral disc extrusion at L4-5 and facet degenerative changes in the face the left lateral recess with mass effect upon the left traversing nerve root. 3. Additional neuroforaminal stenosis at L2-3 on the right. 4. Partial effacement of the bilateral L5-S1 lateral recesses. Electronically signed by:  Carlos Manuel Jose MD  10/7/2021 9:08 AM CDT Workstation: [id number]    XR Hip With or Without Pelvis 2 - 3 View Left    Result Date: 9/27/2021  Narrative: Two view left hip with single view pelvis HISTORY: Left hip pain AP and frog-leg lateral views of the left hip and AP film of the pelvis obtained. COMPARISON: Pelvis March 29, 2021 FINDINGS: No fracture or dislocation. Normal left hip. Degenerative changes right hip with some joint space narrowing and small osteophytes. Mild degenerative change each iliac crest. Degenerative disc disease L3-4. No other osseous or articular abnormality.     Impression: CONCLUSION: Normal left hip. Degenerative changes right hip with some joint space narrowing and small osteophytes. Mild degenerative change each iliac crest. Degenerative disc disease L3-4. [id number] Electronically signed by:   Eloy Gregg MD  9/27/2021 11:00 PM CDT Workstation: [id number]    XR LUMBAR SPINE MIN 4 VIEWS     Narrative     X-ray of the lumbar spine taken 9/18/2021 show degenerative scoliosis. He does have good disc space height through the lumbar vertebra until we get to L5-S1 and we see very narrowed disc space.       Date: 09/23/21   Received From: [facility]             ASSESSMENT:    Diagnoses and all orders for this visit:    Left hip pain    Lumbar radiculopathy    Left leg pain    DDD (degenerative disc disease), lumbar    Degenerative scoliosis    Numbness and tingling of left leg    PLAN    MRI of lumbar spine reviewed and results are discussed with patient today.  We discussed evidence of multilevel degenerative changes with degenerative changes contributing to moderate to severe spinal canal stenosis at L3-L4 as well as moderate bilateral neuroforaminal stenosis.  We also discussed evidence of a left-sided disc extrusion at L4-L5 and degenerative changes at the facet joint at this level causing a mass-effect upon the left transversing nerve root.  We discussed that this is likely the source of his left posterior hip pain that radiates throughout the left leg down to the level of the left foot.  Overall, patient has had some gradual improvement in his pain and symptoms since last office visit.  The pain has not resolved and increases at times with certain positions and activities but overall he is doing better.  We discussed continued conservative treatment efforts versus a referral to neurosurgery for further evaluation.  Patient declines the referral to neurosurgery at this time.  We discussed a trial of a lumbar epidural injection of steroid as a treatment option and the patient also declines this as well today.  Patient states that he is feeling better overall and wants to wait on any further interventions.  Patient is encouraged to let me know if his pain increases and if he wants to  proceed with either one of these options and I will be happy to order them.    Recommend the following:     -Rest and activity modification for now with avoidance of straining, lifting, pulling, tugging, etc.  -Use of ice therapy and/or heat therapy to the low back to minimize pain/inflammation/muscle tension.  -Use of a cane or walker for modified weightbearing from the lumbar spine.  -Continue Voltaren XR daily for consistent dosing of oral NSAIDs.  Patient was previously prescribed this to take as needed for his osteoarthritic pain in the right hip joint.  Patient can also take Tylenol as needed for additional pain control.     Follow-up in 4 to 6 weeks for recheck as needed for any new, worsening or persistent symptoms.  Consider lumbar epidural injection of steroid if his pain/symptoms persist or worsen.  Consider neurosurgical evaluation if his pain/symptoms persist or worsen.  Consider referral to physical therapy.    EMR Dragon/Transciption Disclaimer: Some of this note may be an electronic transcription/translation of spoken language to printed text.  The electronic translation of spoken language may permit erroneous, or at times, nonsensical words or phrases to be inadvertently transcribed. Although I have reviewed the note for such errors, some may still exist.     Return in about 4 weeks (around 11/11/2021), or if symptoms worsen or fail to improve, for Recheck.        This document has been electronically signed by AGNES Hare on October 17, 2021 14:46 CDT      AGNES Hare

## 2021-12-04 ENCOUNTER — APPOINTMENT (OUTPATIENT)
Dept: GENERAL RADIOLOGY | Facility: HOSPITAL | Age: 56
End: 2021-12-04

## 2021-12-04 ENCOUNTER — HOSPITAL ENCOUNTER (EMERGENCY)
Facility: HOSPITAL | Age: 56
Discharge: HOME | End: 2021-12-04
Attending: EMERGENCY MEDICINE | Admitting: EMERGENCY MEDICINE

## 2021-12-04 VITALS
SYSTOLIC BLOOD PRESSURE: 141 MMHG | RESPIRATION RATE: 18 BRPM | HEART RATE: 93 BPM | DIASTOLIC BLOOD PRESSURE: 81 MMHG | OXYGEN SATURATION: 98 % | BODY MASS INDEX: 32.58 KG/M2 | TEMPERATURE: 97.7 F | WEIGHT: 220 LBS | HEIGHT: 69 IN

## 2021-12-04 DIAGNOSIS — S97.81XA CRUSH INJURY OF FOOT, RIGHT, INITIAL ENCOUNTER: Primary | ICD-10-CM

## 2021-12-04 PROCEDURE — 73630 X-RAY EXAM OF FOOT: CPT

## 2021-12-04 PROCEDURE — 99283 EMERGENCY DEPT VISIT LOW MDM: CPT

## 2021-12-04 PROCEDURE — 73610 X-RAY EXAM OF ANKLE: CPT

## 2021-12-04 RX ORDER — HYDROCODONE BITARTRATE AND ACETAMINOPHEN 5; 325 MG/1; MG/1
1 TABLET ORAL ONCE
Status: COMPLETED | OUTPATIENT
Start: 2021-12-04 | End: 2021-12-04

## 2021-12-04 RX ORDER — OMEPRAZOLE 20 MG/1
20 CAPSULE, DELAYED RELEASE ORAL DAILY
COMMUNITY

## 2021-12-04 RX ADMIN — HYDROCODONE BITARTRATE AND ACETAMINOPHEN 1 TABLET: 5; 325 TABLET ORAL at 05:04

## 2021-12-04 NOTE — DISCHARGE INSTRUCTIONS
Please return with new or worsening symptoms.  Rest, ice, elevation, Tylenol or Motrin to help with symptoms.

## 2021-12-04 NOTE — ED PROVIDER NOTES
Subjective   56-year-old male presents to the emergency department via EMS from his place of employment which is a local [employer] after his right foot got caught and smashed he reports laterally on the foot from both sides in a machine.  Reports good sensation but pain and swelling.  Reports the swelling seems to be restricting some movement.  No pain above the ankle.  Tetanus vaccine up-to-date.  No obvious lacerations.    Family history, surgical history, social history, current medications and allergies are reviewed with the patient and triage documentation and vitals are reviewed.      History provided by:  Patient, spouse and EMS personnel   used: No        Review of Systems   Musculoskeletal: Positive for arthralgias and joint swelling. Negative for back pain and neck pain.   Skin: Negative for wound.   Neurological: Negative for weakness and numbness.   All other systems reviewed and are negative.      Past Medical History:   Diagnosis Date   • Degenerative scoliosis    • History of stomach ulcers    • Primary osteoarthritis of right hip 3/29/2021   • Sleep apnea        No Known Allergies    Past Surgical History:   Procedure Laterality Date   • GALLBLADDER SURGERY      9-10 years ago   • HAND SURGERY  2000    Partial amputation finger   • KNEE SURGERY  1985   • VASECTOMY      20 years ago       Family History   Problem Relation Age of Onset   • Hypertension Mother    • Cancer Mother    • Hypertension Father    • Hypertension Brother        Social History     Socioeconomic History   • Marital status:    Tobacco Use   • Smoking status: Never Smoker   • Smokeless tobacco: Never Used   Substance and Sexual Activity   • Alcohol use: No   • Drug use: No   • Sexual activity: Yes     Partners: Female     Birth control/protection: Surgical, Other     Comment: Vasectomy           Objective   Physical Exam  Vitals and nursing note reviewed.   Constitutional:       Appearance: Normal appearance.  He is obese.   HENT:      Head: Normocephalic and atraumatic.   Eyes:      Pupils: Pupils are equal, round, and reactive to light.   Cardiovascular:      Rate and Rhythm: Normal rate and regular rhythm.      Pulses: Normal pulses.   Pulmonary:      Effort: Pulmonary effort is normal.      Breath sounds: Normal breath sounds.   Musculoskeletal:      Right ankle: Swelling present. No deformity, ecchymosis or lacerations. Tenderness present over the lateral malleolus and medial malleolus. Decreased range of motion.      Right Achilles Tendon: Normal.      Left ankle: Normal.      Left Achilles Tendon: Normal.      Right foot: Decreased range of motion. Normal capillary refill. Swelling and tenderness present. No deformity, foot drop, prominent metatarsal heads, laceration, bony tenderness or crepitus. Normal pulse.      Left foot: Normal.   Skin:     General: Skin is warm and dry.      Capillary Refill: Capillary refill takes less than 2 seconds.   Neurological:      Mental Status: He is alert.         Procedures  none         ED Course    Labs Reviewed - No data to display  XR Ankle 3+ View Right    Result Date: 12/4/2021  Narrative: EXAM DESCRIPTION: XR ANKLE 3+ VW RIGHT 12/4/2021 3:38 AM CST RadLex: XR ANKLE 3 OR MORE VIEWS CLINICAL HISTORY: 56 years Male, ankle pain COMPARISON: None. FINDINGS: Trinity alignment is normal No bones are fractured. No foreign bodies are perceived No soft tissue swelling is present.     Impression: Normal. Electronically signed by:  Colten Hampton MD  12/4/2021 4:26 AM CST Workstation: [id number]    XR Foot 3+ View Right    Result Date: 12/4/2021  Narrative: EXAM DESCRIPTION: XR FOOT 3+ VW RIGHT 12/4/2021 3:38 AM CST RadLex: XR FOOT 3 OR MORE VIEWS CLINICAL HISTORY: 56 years Male, foot injury COMPARISON: None. FINDINGS: Trinity alignment is normal No bones are fractured. No foreign bodies are perceived Soft tissue swelling is present over the dorsum of the foot at the level of the distal  metatarsals. Minimal dorsal and plantar calcaneal spurring     Impression: Normal.  Electronically signed by:  Colten Hampton MD  12/4/2021 4:27 AM CST Workstation: [id number]            [facility]  Number of Diagnoses or Management Options     Amount and/or Complexity of Data Reviewed  Tests in the radiology section of CPT®: reviewed    Patient Progress  Patient progress: stable    X-ray imaging reveals no acute osseous abnormality in the foot or ankle.  Patient advised on symptomatic treatment including rest, elevation, ice and Tylenol and Motrin.  Agreeable to discharge and outpatient follow-up as needed.    Final diagnoses:   Crush injury of foot, right, initial encounter       ED Disposition  ED Disposition     ED Disposition Condition Comment    Discharge Stable           [facility] EMERGENCY DEPARTMENT  [address] [place] [id number] [phone]    If symptoms worsen         Medication List      Stop    methocarbamol 750 MG tablet  Commonly known as: Rick Hill, DO  12/04/21 2131

## 2022-05-16 DIAGNOSIS — M25.551 RIGHT HIP PAIN: Primary | ICD-10-CM

## 2022-05-18 ENCOUNTER — OFFICE VISIT (OUTPATIENT)
Dept: ORTHOPEDIC SURGERY | Facility: CLINIC | Age: 57
End: 2022-05-18

## 2022-05-18 VITALS — HEIGHT: 69 IN | WEIGHT: 224 LBS | BODY MASS INDEX: 33.18 KG/M2

## 2022-05-18 DIAGNOSIS — M16.11 PRIMARY OSTEOARTHRITIS OF RIGHT HIP: ICD-10-CM

## 2022-05-18 DIAGNOSIS — M25.551 RIGHT HIP PAIN: Primary | ICD-10-CM

## 2022-05-18 PROCEDURE — 99213 OFFICE O/P EST LOW 20 MIN: CPT | Performed by: NURSE PRACTITIONER

## 2022-05-18 PROCEDURE — 96372 THER/PROPH/DIAG INJ SC/IM: CPT | Performed by: NURSE PRACTITIONER

## 2022-05-18 RX ORDER — KETOROLAC TROMETHAMINE 30 MG/ML
60 INJECTION, SOLUTION INTRAMUSCULAR; INTRAVENOUS ONCE
Status: COMPLETED | OUTPATIENT
Start: 2022-05-18 | End: 2022-05-18

## 2022-05-18 RX ORDER — TRIAMCINOLONE ACETONIDE 40 MG/ML
80 INJECTION, SUSPENSION INTRA-ARTICULAR; INTRAMUSCULAR ONCE
Status: COMPLETED | OUTPATIENT
Start: 2022-05-18 | End: 2022-05-18

## 2022-05-18 RX ADMIN — KETOROLAC TROMETHAMINE 60 MG: 30 INJECTION, SOLUTION INTRAMUSCULAR; INTRAVENOUS at 09:10

## 2022-05-18 RX ADMIN — TRIAMCINOLONE ACETONIDE 80 MG: 40 INJECTION, SUSPENSION INTRA-ARTICULAR; INTRAMUSCULAR at 09:10

## 2022-05-18 NOTE — PROGRESS NOTES
"Everette Mckeon is a 56 y.o. male returns for     Chief Complaint   Patient presents with   • Right Hip - Follow-up, Pain     HISTORY OF PRESENT ILLNESS: Patient presents to office for follow-up of chronic right hip pain due to known osteoarthritis.  Initial onset of pain occurred approximately 2-1/2 years ago with no known injury or incident.  Patient has been treated conservatively in the past with prescription oral NSAIDs and intramuscular injections of Kenalog and Toradol, which have offered him significant pain improvement for several months at a time in the past.  Patient continues to take Voltaren and is tolerating this medication well with no known adverse effects.  Patient reports some gradual return/increase in his right hip pain in recent weeks with no known injury or incident.  Patient localizes the pain more to the posterolateral right hip. Patient has increased pain with longer periods of standing and walking. 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  No new complaints or concerns noted since last office visit.  No falls or injuries reported since last office visit.  Repeat x-rays are performed today in office of the right hip/pelvis.  Pain scale currently 6/10.     CONCURRENT MEDICAL HISTORY:    The following portions of the patient's history were reviewed and updated as appropriate: allergies, current medications, past family history, past medical history, past social history, past surgical history and problem list.     ROS  No fevers or chills.  No chest pain or shortness of air.  No GI or  disturbances. Right hip pain.     PHYSICAL EXAMINATION:       Ht 175.3 cm (69\")   Wt 102 kg (224 lb)   BMI 33.08 kg/m²     Physical Exam  Vitals reviewed.   Constitutional:       General: He is not in acute " distress.     Appearance: He is well-developed. He is not ill-appearing.   HENT:      Head: Normocephalic.   Pulmonary:      Effort: Pulmonary effort is normal. No respiratory distress.   Abdominal:      General: There is no distension.      Palpations: Abdomen is soft.   Musculoskeletal:         General: No swelling, tenderness, deformity or signs of injury.   Skin:     General: Skin is warm and dry.      Capillary Refill: Capillary refill takes less than 2 seconds.      Findings: No erythema.   Neurological:      Mental Status: He is alert and oriented to person, place, and time.      GCS: GCS eye subscore is 4. GCS verbal subscore is 5. GCS motor subscore is 6.      Sensory: No sensory deficit.   Psychiatric:         Speech: Speech normal.         Behavior: Behavior normal.         Thought Content: Thought content normal.         Judgment: Judgment normal.         GAIT:     []  Normal  [x]  Antalgic (mild)    Assistive device: [x]  None  []  Walker     []  Crutches  []  Cane     []  Wheelchair  []  Stretcher    Right Hip Exam     Tenderness   The patient is experiencing no tenderness.     Range of Motion   Abduction: 30   Flexion: 100   External rotation: 50   Internal rotation: 5     Muscle Strength   The patient has normal right hip strength.    Tests   DEE: positive  Fadir:  Positive FADIR test    Other   Erythema: absent  Sensation: normal  Pulse: present    Comments:  Pain and limitations with range of motion.  No deformity.  No significant leg length discrepancy noted.  No tenderness to palpation.  No swelling appreciated.  No erythema.  No warmth.  No signs of infection noted.            XR Hip With or Without Pelvis 2 - 3 View Right    Result Date: 5/19/2022  Narrative: EXAMINATION:  XR HIP W OR WO PELVIS 2-3 VIEW RIGHT CLINICAL HISTORY:  56 years Male,pain, M25.551 Pain in right hip COMPARISON:  9/27/2021 plain films FINDINGS:  Degenerative arthritis on the right hip with near complete loss of joint  space. There is associated subchondral sclerosis and cystic change as well as marginal hypertrophic osteophytes. More mild degenerative arthritis about the left hip with relative preservation of joint space. No acute fracture. No dislocation.     Impression: Moderate/advanced degenerative arthritis involving the right hip. Electronically signed by:  Carlos Manuel Jose MD  5/19/2022 8:46 AM CDT Workstation: [id number]        ASSESSMENT:    Diagnoses and all orders for this visit:    Right hip pain  -     ketorolac (TORADOL) injection 60 mg  -     triamcinolone acetonide (KENALOG-40) injection 80 mg    Primary osteoarthritis of right hip  -     ketorolac (TORADOL) injection 60 mg  -     triamcinolone acetonide (KENALOG-40) injection 80 mg    PLAN    X-rays of the right hip/pelvis repeated in office today and reviewed with no acute findings noted.  Patient has redemonstration of advancing osteoarthritic changes in the right hip joint, which appear to have progressed when compared with prior images from March 2021.  Subjective complaints and physical exam remain consistent with osteoarthritic right hip pain.  He is not having any symptoms suggestive of lumbar radiculopathy at this time.  Pain can be reproduced with motion of his hip joint.  The patient does localize the pain in a more atypical area of the posterolateral hip.  There is no tenderness overlying the greater trochanter.  We again discussed conservative management options versus eventual surgical consult for right total hip arthroplasty.  At this time, the patient is not interested in surgical consult and wants to proceed with conservative care.  We again discussed the option of a trial of an intra-articular injection of steroid into the right hip joint and while the patient is open to this, he states he has always gotten good improvement with intramuscular injections and would like to proceed with repeat intramuscular injections today.  Recommend IM injections of  Toradol 60 mg and Kenalog 80 mg today for management of joint pain/inflammation.  We discussed that he can call the office and notify me if these injections are ineffective and he would like to proceed with the intra-articular injection of his right hip, which we again discussed is performed at the hospital by the interventional radiologist.    Recommend the following:    -Rest and activity modification as tolerated and based on pain.  -Use of a cane or walker as needed for modified weightbearing off the right hip.   -Gradual progression of weightbearing and activity as pain allows.   -Ice therapy to the affected hip as needed to minimize pain/inflammation.   -Continue with Voltaren XR daily as needed for management of joint pain/inflammation.  Patient can also take Tylenol as needed for additional pain control.    Follow-up in 4 to 6 weeks for recheck as needed for any new, worsening or persistent symptoms.  We discussed that if he has good relief with the intramuscular injections again, he can follow-up on an as-needed basis.  We also discussed that he can call the office and notify me if the intramuscular injections are ineffective and I will be happy to order the intra-articular injection of steroid to be given into his right hip joint.    Time spent of a minimum of 20 minutes including the face to face evaluation, reviewing of medical history and prior medial records, reviewing of diagnostic studies, documentation, patient education and coordination of care.     EMR Dragon/bSafe Disclaimer: Some of this note may be an electronic transcription/translation of spoken language to printed text using the Dragon Dictation System.     Return in about 4 weeks (around 6/15/2022), or if symptoms worsen or fail to improve, for Recheck.        This document has been electronically signed by AGNES Hare on May 19, 2022 08:51 CDT      AGNES Hare

## 2022-07-25 ENCOUNTER — OFFICE VISIT (OUTPATIENT)
Dept: ORTHOPEDIC SURGERY | Facility: CLINIC | Age: 57
End: 2022-07-25

## 2022-07-25 VITALS — WEIGHT: 224 LBS | HEIGHT: 69 IN | BODY MASS INDEX: 33.18 KG/M2

## 2022-07-25 DIAGNOSIS — M47.816 FACET ARTHROPATHY, LUMBAR: ICD-10-CM

## 2022-07-25 DIAGNOSIS — G89.29 CHRONIC LEFT-SIDED LOW BACK PAIN WITH LEFT-SIDED SCIATICA: ICD-10-CM

## 2022-07-25 DIAGNOSIS — M48.061 FORAMINAL STENOSIS OF LUMBAR REGION: ICD-10-CM

## 2022-07-25 DIAGNOSIS — M51.36 DDD (DEGENERATIVE DISC DISEASE), LUMBAR: ICD-10-CM

## 2022-07-25 DIAGNOSIS — M25.552 LEFT HIP PAIN: Primary | ICD-10-CM

## 2022-07-25 DIAGNOSIS — M48.061 SPINAL STENOSIS OF LUMBAR REGION WITHOUT NEUROGENIC CLAUDICATION: ICD-10-CM

## 2022-07-25 DIAGNOSIS — M41.50 DEGENERATIVE SCOLIOSIS: ICD-10-CM

## 2022-07-25 DIAGNOSIS — M54.42 CHRONIC LEFT-SIDED LOW BACK PAIN WITH LEFT-SIDED SCIATICA: ICD-10-CM

## 2022-07-25 DIAGNOSIS — M79.605 LEFT LEG PAIN: ICD-10-CM

## 2022-07-25 DIAGNOSIS — M54.16 LUMBAR RADICULOPATHY: ICD-10-CM

## 2022-07-25 PROCEDURE — 96372 THER/PROPH/DIAG INJ SC/IM: CPT | Performed by: NURSE PRACTITIONER

## 2022-07-25 PROCEDURE — 99214 OFFICE O/P EST MOD 30 MIN: CPT | Performed by: NURSE PRACTITIONER

## 2022-07-25 RX ORDER — HYDROCODONE BITARTRATE AND ACETAMINOPHEN 7.5; 325 MG/1; MG/1
1 TABLET ORAL EVERY 6 HOURS PRN
Qty: 40 TABLET | Refills: 0 | Status: SHIPPED | OUTPATIENT
Start: 2022-07-25 | End: 2022-08-04

## 2022-07-25 RX ORDER — KETOROLAC TROMETHAMINE 30 MG/ML
60 INJECTION, SOLUTION INTRAMUSCULAR; INTRAVENOUS ONCE
Status: COMPLETED | OUTPATIENT
Start: 2022-07-25 | End: 2022-07-25

## 2022-07-25 RX ORDER — TRIAMCINOLONE ACETONIDE 40 MG/ML
60 INJECTION, SUSPENSION INTRA-ARTICULAR; INTRAMUSCULAR ONCE
Status: COMPLETED | OUTPATIENT
Start: 2022-07-25 | End: 2022-07-25

## 2022-07-25 RX ORDER — TIZANIDINE 4 MG/1
4 TABLET ORAL
COMMUNITY
Start: 2022-07-23 | End: 2022-08-01

## 2022-07-25 RX ORDER — PREDNISONE 10 MG/1
20 TABLET ORAL DAILY
Qty: 15 TABLET | Refills: 0 | Status: SHIPPED | OUTPATIENT
Start: 2022-07-25 | End: 2022-09-22

## 2022-07-25 RX ADMIN — TRIAMCINOLONE ACETONIDE 60 MG: 40 INJECTION, SUSPENSION INTRA-ARTICULAR; INTRAMUSCULAR at 14:16

## 2022-07-25 RX ADMIN — KETOROLAC TROMETHAMINE 60 MG: 30 INJECTION, SOLUTION INTRAMUSCULAR; INTRAVENOUS at 14:15

## 2022-07-25 NOTE — PROGRESS NOTES
Everette Mckeon is a 56 y.o. male returns for     Chief Complaint   Patient presents with   • Left Hip - Follow-up, Pain   • Lumbar Spine - Pain, Follow-up     HISTORY OF PRESENT ILLNESS: Patient presents to office for follow-up of chronic/recurrent low back pain.  Initial onset of pain occurred abruptly on 8/20/2021 after bending over at work.  At that time, patient was experiencing severe left-sided low back pain with pain that radiated into his left leg.  MRI imaging performed in October 2021 showed evidence of moderate to severe spinal stenosis as well as moderate bilateral foraminal stenosis at L3-L4.  Also, patient had evidence of a left-sided disc protrusion at L4-L5 resulting in a mass-effect and nerve compression on the left side.  The patient was treated conservatively with intramuscular injections of Toradol and Kenalog, oral steroids, oral NSAIDs, muscle relaxants and rest/activity modification.  Patient had significant improvement in his pain and essentially had resolution of his pain for several months.  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  However, in the past couple of days he has experienced acute and severe left-sided low back pain that again radiates into his left hip and down his left leg.  The patient was recently evaluated at an outlying urgent care and he states he was given an intramuscular injection of steroid but it has not improved his pain.  Pain is currently described as severe and primarily affecting his left hip/buttock and left leg.  Patient also reports numbness and tingling symptoms affecting the left lower extremity.  Pain scale today is 8/10.      CONCURRENT MEDICAL HISTORY:    The following portions of the patient's history were reviewed and updated as appropriate: allergies, current medications, past family history, past medical history, past social history, past surgical  "history and problem list.     ROS  No fevers or chills.  No chest pain or shortness of air.  No GI or  disturbances. Left leg pain. Low back pain.     PHYSICAL EXAMINATION:       Ht 175.3 cm (69\")   Wt 102 kg (224 lb)   BMI 33.08 kg/m²     Physical Exam  Vitals reviewed.   Constitutional:       General: He is not in acute distress.     Appearance: He is well-developed. He is not ill-appearing.   HENT:      Head: Normocephalic.   Pulmonary:      Effort: Pulmonary effort is normal. No respiratory distress.   Abdominal:      General: There is no distension.      Palpations: Abdomen is soft.   Musculoskeletal:         General: Tenderness (Lumbar spine) present. No swelling, deformity or signs of injury.      Lumbar back: Positive left straight leg raise test. Negative right straight leg raise test.   Skin:     General: Skin is warm and dry.      Capillary Refill: Capillary refill takes less than 2 seconds.      Findings: No erythema.   Neurological:      Mental Status: He is alert and oriented to person, place, and time.      GCS: GCS eye subscore is 4. GCS verbal subscore is 5. GCS motor subscore is 6.      Sensory: Sensory deficit (Left leg) present.   Psychiatric:         Speech: Speech normal.         Behavior: Behavior normal.         Thought Content: Thought content normal.         Judgment: Judgment normal.         GAIT:     []  Normal  []  Antalgic    Assistive device: []  None  []  Walker     []  Crutches  []  Cane     [x]  Wheelchair  []  Stretcher    Back Exam     Tenderness   The patient is experiencing tenderness in the lumbar.    Range of Motion   Extension: abnormal   Flexion: abnormal   Rotation right: abnormal   Rotation left: abnormal     Muscle Strength   Right Quadriceps:  4/5   Left Quadriceps:  4/5     Tests   Straight leg raise right: negative  Straight leg raise left: positive    Other   Sensation: decreased (LLE)  Erythema: no back redness          MRI Lumbar Spine Without " Contrast     Result Date: 10/7/2021  Narrative: MRI LUMBAR SPINE CLINICAL HISTORY:  56 years Male,Low back pain with left hip/buttock pain radiating down left leg with numbness and tingling?evaluate for nerve compression/lumbar radiculopathy; no injury., M25.552 Pain in left hip M54.16 Radiculopathy, lumbar region M79.605 Pain in left leg M51.36 Other intervertebral disc degeneration, lumbar region M41.80 Other forms of scoliosis, site unspecified R20.0 Anesthesia of skin R20.2 Paresthesia of COMPARISON:  None TECHNIQUE: Sagittal and axial images were obtained without intravenous contrast. FINDINGS: Mild, grade I anterolisthesis of L3 on L4. Grade I retrolisthesis of L5 on S1. No acute fracture. No suspicious bony lesion in the lumbar spine. Signal in the visualized cord is normal. L1-2: Broad-based posterior lateral disc bulging. Mild hypertrophic facet degenerative changes. These findings produce mild spinal canal stenosis as well as mild bilateral neuroforaminal stenosis with partial effacement of the inferior neural foramen. L2-3: Broad-based posterior and lateral disc bulging, slightly asymmetric to the right. Bilateral hypertrophic facet degenerative changes, right also slightly worse than left. These findings produce mild spinal canal stenosis as well as mild/moderate right and mild left neuroforaminal stenosis. There appears to be dorsal abutment of the exiting right nerve root by the hypertrophic facet degenerative change. L3-4: Grade I anterolisthesis of L3 on L4. Broad-based posterior lateral disc bulging. Moderate bilateral facet degenerative changes with ligament flavum hypertrophy. These findings produce moderate/severe spinal canal stenosis related to both the disc bulging and ligamentum flavum hypertrophy/facet degenerative change. There is also moderate bilateral neuroforaminal stenosis, right worse than left with likely abutment of the exiting nerve roots. L4-5: Broad-based posterior and lateral  disc bulging with a small left posterior paracentral disc extrusion extending slightly inferiorly along the superior margin of the L5 vertebral body. Bilateral hypertrophic facet degenerative changes. There is mild spinal canal stenosis with effacement of the left lateral recess, related to both the disc extrusion and the facet degenerative changes. This exerts mass effect upon the left traversing nerve root. There is also mild bilateral neuroforaminal stenosis. L5-S1: Grade I retrolisthesis of L5 on S1. Broad-based posterior and lateral disc bulging. Bilateral facet degenerative changes. These findings produce partial effacement of the left greater than right lateral recess as well as mild bilateral neuroforaminal stenosis. The visualized portions of retroperitoneum are grossly unremarkable.      Impression: 1. Degenerative changes at L3-4 produce moderate/severe spinal canal stenosis as well as moderate bilateral neuroforaminal stenosis. 2. A left posterior paracentral disc extrusion at L4-5 and facet degenerative changes in the face the left lateral recess with mass effect upon the left traversing nerve root. 3. Additional neuroforaminal stenosis at L2-3 on the right. 4. Partial effacement of the bilateral L5-S1 lateral recesses. Electronically signed by:  Carlos Manuel Jose MD  10/7/2021 9:08 AM CDT Workstation: [id number]     XR Hip With or Without Pelvis 2 - 3 View Left     Result Date: 9/27/2021  Narrative: Two view left hip with single view pelvis HISTORY: Left hip pain AP and frog-leg lateral views of the left hip and AP film of the pelvis obtained. COMPARISON: Pelvis March 29, 2021 FINDINGS: No fracture or dislocation. Normal left hip. Degenerative changes right hip with some joint space narrowing and small osteophytes. Mild degenerative change each iliac crest. Degenerative disc disease L3-4. No other osseous or articular abnormality.      Impression: CONCLUSION: Normal left hip. Degenerative changes right hip  with some joint space narrowing and small osteophytes. Mild degenerative change each iliac crest. Degenerative disc disease L3-4. [id number] Electronically signed by:  Eloy Gregg MD  9/27/2021 11:00 PM CDT Workstation: [id number] LUMBAR SPINE MIN 4 VIEWS     Narrative     X-ray of the lumbar spine taken 9/18/2021 show degenerative scoliosis. He does have good disc space height through the lumbar vertebra until we get to L5-S1 and we see very narrowed disc space.       Date: 09/23/21   Received From: [facility]          ASSESSMENT:    Diagnoses and all orders for this visit:    Left hip pain  -     HYDROcodone-acetaminophen (NORCO) 7.5-325 MG per tablet; Take 1 tablet by mouth Every 6 (Six) Hours As Needed for Moderate Pain  or Severe Pain  for up to 10 days.  -     triamcinolone acetonide (KENALOG-40) injection 60 mg  -     ketorolac (TORADOL) injection 60 mg    Left leg pain  -     HYDROcodone-acetaminophen (NORCO) 7.5-325 MG per tablet; Take 1 tablet by mouth Every 6 (Six) Hours As Needed for Moderate Pain  or Severe Pain  for up to 10 days.  -     triamcinolone acetonide (KENALOG-40) injection 60 mg  -     ketorolac (TORADOL) injection 60 mg    Degenerative scoliosis  -     HYDROcodone-acetaminophen (NORCO) 7.5-325 MG per tablet; Take 1 tablet by mouth Every 6 (Six) Hours As Needed for Moderate Pain  or Severe Pain  for up to 10 days.  -     triamcinolone acetonide (KENALOG-40) injection 60 mg  -     ketorolac (TORADOL) injection 60 mg    DDD (degenerative disc disease), lumbar  -     HYDROcodone-acetaminophen (NORCO) 7.5-325 MG per tablet; Take 1 tablet by mouth Every 6 (Six) Hours As Needed for Moderate Pain  or Severe Pain  for up to 10 days.  -     triamcinolone acetonide (KENALOG-40) injection 60 mg  -     ketorolac (TORADOL) injection 60 mg    Lumbar radiculopathy  -     HYDROcodone-acetaminophen (NORCO) 7.5-325 MG per tablet; Take 1 tablet by mouth Every 6 (Six) Hours As Needed for  Moderate Pain  or Severe Pain  for up to 10 days.  -     triamcinolone acetonide (KENALOG-40) injection 60 mg  -     ketorolac (TORADOL) injection 60 mg    Chronic left-sided low back pain with left-sided sciatica  -     HYDROcodone-acetaminophen (NORCO) 7.5-325 MG per tablet; Take 1 tablet by mouth Every 6 (Six) Hours As Needed for Moderate Pain  or Severe Pain  for up to 10 days.  -     triamcinolone acetonide (KENALOG-40) injection 60 mg  -     ketorolac (TORADOL) injection 60 mg    Foraminal stenosis of lumbar region  -     HYDROcodone-acetaminophen (NORCO) 7.5-325 MG per tablet; Take 1 tablet by mouth Every 6 (Six) Hours As Needed for Moderate Pain  or Severe Pain  for up to 10 days.  -     triamcinolone acetonide (KENALOG-40) injection 60 mg  -     ketorolac (TORADOL) injection 60 mg    Spinal stenosis of lumbar region without neurogenic claudication  -     HYDROcodone-acetaminophen (NORCO) 7.5-325 MG per tablet; Take 1 tablet by mouth Every 6 (Six) Hours As Needed for Moderate Pain  or Severe Pain  for up to 10 days.  -     triamcinolone acetonide (KENALOG-40) injection 60 mg  -     ketorolac (TORADOL) injection 60 mg    Facet arthropathy, lumbar  -     HYDROcodone-acetaminophen (NORCO) 7.5-325 MG per tablet; Take 1 tablet by mouth Every 6 (Six) Hours As Needed for Moderate Pain  or Severe Pain  for up to 10 days.  -     triamcinolone acetonide (KENALOG-40) injection 60 mg  -     ketorolac (TORADOL) injection 60 mg    Other orders  -     tiZANidine (ZANAFLEX) 4 MG tablet; Take 4 mg by mouth.  -     predniSONE (DELTASONE) 10 MG tablet; Take 2 tablets by mouth Daily. For 5 days then 1 tablet daily for 5 days    PLAN      Patient complains of increased and severe left-sided low back pain that radiates into the left hip and throughout the length of his left leg.  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  However, he states he was simply walking down a ramp a couple of days ago and had sudden onset of severe left-sided low back pain and left leg pain.  Patient is quite uncomfortable in office today and has increased pain when he tries to sit upright.  Physical exam is highly suspicious for acute lumbar radiculopathy/nerve compression.  Patient has known degenerative disc disease and multilevel degenerative changes in his lumbar spine from previous MRI imaging performed in October 2021.  He was also experiencing left-sided lumbar radiculopathy at that time that was treated conservatively with resolution of his pain and symptoms for several months.  We discussed that he will likely need a repeat MRI in anticipation for a referral to neurosurgery.  At this time, patient wants to wait on a repeat MRI and a referral to neurosurgery and proceed with some conservative care.  Recommend intramuscular injections of Toradol 60 mg and Kenalog 80 mg today for management of pain/inflammation/lumbar radiculopathy.  A tapered course of prednisone is also prescribed for further management of pain/inflammation.    Recommend the following:    -Rest and activity modification for now with avoidance of straining, lifting, pulling, tugging, etc.  -Use of ice therapy and/or heat therapy to the low back to minimize pain/inflammation/muscle tension.  -Use of a cane or walker for modified weightbearing from the lumbar spine.  -Continue Voltaren XR daily as needed for control of joint pain/inflammation.  Patient can also take Tylenol as needed for additional pain control.    Norco 7.5 mg is prescribed to take as needed for moderate to severe pain as currently the patient has severe pain that is poorly controlled. Patient is instructed to take the pain medication sparingly and to take the least amount of pain medication needed to control the pain.  Patient is cautioned that opioids can be addictive.   We discussed other potential adverse side effects of opioids including constipation, dizziness, drowsiness, increased risk for falls and/or respiratory depression.  Patient verbalized understanding of these risks.  I have encouraged the patient to focus on nonopioid pain management methods as much as possible.  DIAMOND is reviewed internally via Epic and is noted to be appropriate.    Follow-up in 2 weeks for recheck as needed for any new, worsening or persistent symptoms.  Follow-up sooner as needed.  We also discussed that he can call the office if his pain persists and he wants to proceed with repeat MRI for neurosurgical referral.    Time spent of a minimum of 30 minutes including the face to face evaluation, reviewing of medical history and prior medial records, reviewing of diagnostic studies, prescription drug management, documentation, patient education and coordination of care.     EMR Dragon/Envysion Disclaimer: Some of this note may be an electronic transcription/translation of spoken language to printed text using the Dragon Dictation System.     Return in about 2 weeks (around 8/8/2022), or if symptoms worsen or fail to improve, for Recheck.        This document has been electronically signed by AGNES Hare on July 26, 2022 20:54 CDT      AGNES Hare

## 2022-08-01 ENCOUNTER — OFFICE VISIT (OUTPATIENT)
Dept: ORTHOPEDIC SURGERY | Facility: CLINIC | Age: 57
End: 2022-08-01

## 2022-08-01 VITALS — BODY MASS INDEX: 32.14 KG/M2 | WEIGHT: 217 LBS | HEIGHT: 69 IN

## 2022-08-01 DIAGNOSIS — M41.50 DEGENERATIVE SCOLIOSIS: ICD-10-CM

## 2022-08-01 DIAGNOSIS — M79.605 LEFT LEG PAIN: ICD-10-CM

## 2022-08-01 DIAGNOSIS — M54.16 LUMBAR RADICULOPATHY: ICD-10-CM

## 2022-08-01 DIAGNOSIS — M48.061 FORAMINAL STENOSIS OF LUMBAR REGION: ICD-10-CM

## 2022-08-01 DIAGNOSIS — R20.0 NUMBNESS AND TINGLING OF LEFT LEG: ICD-10-CM

## 2022-08-01 DIAGNOSIS — G89.29 CHRONIC LEFT-SIDED LOW BACK PAIN WITH LEFT-SIDED SCIATICA: ICD-10-CM

## 2022-08-01 DIAGNOSIS — R20.2 NUMBNESS AND TINGLING OF LEFT LEG: ICD-10-CM

## 2022-08-01 DIAGNOSIS — M48.061 SPINAL STENOSIS OF LUMBAR REGION WITHOUT NEUROGENIC CLAUDICATION: ICD-10-CM

## 2022-08-01 DIAGNOSIS — M54.42 CHRONIC LEFT-SIDED LOW BACK PAIN WITH LEFT-SIDED SCIATICA: ICD-10-CM

## 2022-08-01 DIAGNOSIS — M51.36 DDD (DEGENERATIVE DISC DISEASE), LUMBAR: ICD-10-CM

## 2022-08-01 DIAGNOSIS — M25.552 LEFT HIP PAIN: Primary | ICD-10-CM

## 2022-08-01 DIAGNOSIS — M47.816 FACET ARTHROPATHY, LUMBAR: ICD-10-CM

## 2022-08-01 PROCEDURE — 99214 OFFICE O/P EST MOD 30 MIN: CPT | Performed by: NURSE PRACTITIONER

## 2022-08-01 RX ORDER — TIZANIDINE 4 MG/1
4 TABLET ORAL EVERY 8 HOURS PRN
Qty: 90 TABLET | Refills: 1 | Status: SHIPPED | OUTPATIENT
Start: 2022-08-01

## 2022-08-01 NOTE — PROGRESS NOTES
Everette Mckeon is a 56 y.o. male returns for     Chief Complaint   Patient presents with   • Left Hip - Follow-up, Pain   • Lumbar Spine - Pain, Follow-up     HISTORY OF PRESENT ILLNESS: Patient presents to office for follow-up of chronic/recurrent low back pain with pain that radiates into the left hip/buttock and down his left leg.  Initial onset of pain occurred abruptly on 8/20/2021 after bending over at work.  At that time, the patient was experiencing severe left-sided low back pain with pain radiating throughout his left leg as well. MRI imaging performed in October 2021 showed evidence of moderate to severe spinal stenosis as well as moderate bilateral foraminal stenosis at L3-L4. Also, patient had evidence of a left-sided disc protrusion at L4-L5 resulting in a mass-effect and nerve compression on the left side.  The patient was treated conservatively with intramuscular injections of Toradol and Kenalog, oral steroids, oral NSAIDs, muscle relaxants and rest/activity modification. Patient had significant improvement in his pain and essentially had resolution of his pain for several months.      Patient was recently seen in office on 7/25/2022 for an acute exacerbation of very similar pain.  He had very localized pain in the posterior left hip/buttock as well as lumbar radiculopathy affecting the left leg.  The recent exacerbation was not associated with any particular activity or injury.  He states that he has noticed some increased chronic low back pain in recent weeks and then it suddenly became severe.  Patient has recently been treated with intramuscular injections of Kenalog and Toradol, oral prednisone, prescription anti-inflammatory medications (Voltaren), muscle relaxants (Zanaflex), pain medication (Norco) rest/activity modification and time off from work.  The patient works underground in the coal mines and it was felt that his acute and severe low back and left leg pain would have a better  "chance of improving if he took some time off from work.  Overall, patient states he is much better with the above treatments.  He continues to have some pain in the left posterior hip/buttock but states it is overall improved.  He also has some mild, intermittent residual pain in the distal aspect of his left lower leg and some intermittent numbness/tingling symptoms but overall states it is improved.  The patient has also been doing home stretching exercises with the assistance of his wife, who is a physical therapy assistant.  He has also been using an inversion table as well as a TENS unit, both of which have offered some improvement.  Patient states today that he wants to return to work.  No new complaints or concerns noted since last office visit.  Pain scale currently is 4/10.     CONCURRENT MEDICAL HISTORY:    The following portions of the patient's history were reviewed and updated as appropriate: allergies, current medications, past family history, past medical history, past social history, past surgical history and problem list.     ROS  No fevers or chills.  No chest pain or shortness of air.  No GI or  disturbances.  Left hip/buttock pain.  Left leg pain.    PHYSICAL EXAMINATION:       Ht 175.3 cm (69\")   Wt 98.4 kg (217 lb)   BMI 32.05 kg/m²     Physical Exam  Vitals reviewed.   Constitutional:       General: He is not in acute distress.     Appearance: He is well-developed. He is not ill-appearing.   HENT:      Head: Normocephalic.   Pulmonary:      Effort: Pulmonary effort is normal. No respiratory distress.   Abdominal:      General: There is no distension.      Palpations: Abdomen is soft.   Musculoskeletal:         General: Tenderness (Mild, lumbar spine, left buttock) present. No swelling, deformity or signs of injury.      Lumbar back: Positive left straight leg raise test. Negative right straight leg raise test.   Skin:     General: Skin is warm and dry.      Capillary Refill: Capillary " refill takes less than 2 seconds.      Findings: No erythema.   Neurological:      Mental Status: He is alert and oriented to person, place, and time.      GCS: GCS eye subscore is 4. GCS verbal subscore is 5. GCS motor subscore is 6.      Sensory: Sensory deficit (Mild, left lower leg) present.   Psychiatric:         Speech: Speech normal.         Behavior: Behavior normal.         Thought Content: Thought content normal.         Judgment: Judgment normal.         GAIT:     []  Normal  [x]  Antalgic    Assistive device: [x]  None  []  Walker     []  Crutches  []  Cane     []  Wheelchair  []  Stretcher    Back Exam     Tenderness   The patient is experiencing tenderness in the lumbar.    Range of Motion   Extension: abnormal   Flexion: abnormal   Rotation right: abnormal   Rotation left: abnormal     Muscle Strength   Right Quadriceps:  4/5   Left Quadriceps:  4/5     Tests   Straight leg raise right: negative  Straight leg raise left: positive    Other   Sensation: decreased (Mild, left lower leg)  Gait: antalgic   Erythema: no back redness    Comments:  Patient appears more comfortable compared to last office visit.  He is ambulatory in office today with no significant difficulty but does have a slight limp/antalgic gait.  Mild pain and tenderness in the lumbar spine and left posterior hip/buttock, improved from prior exam.            MRI Lumbar Spine Without Contrast     Result Date: 10/7/2021  Narrative: MRI LUMBAR SPINE CLINICAL HISTORY:  56 years Male,Low back pain with left hip/buttock pain radiating down left leg with numbness and tingling?evaluate for nerve compression/lumbar radiculopathy; no injury., M25.552 Pain in left hip M54.16 Radiculopathy, lumbar region M79.605 Pain in left leg M51.36 Other intervertebral disc degeneration, lumbar region M41.80 Other forms of scoliosis, site unspecified R20.0 Anesthesia of skin R20.2 Paresthesia of COMPARISON:  None TECHNIQUE: Sagittal and axial images were  obtained without intravenous contrast. FINDINGS: Mild, grade I anterolisthesis of L3 on L4. Grade I retrolisthesis of L5 on S1. No acute fracture. No suspicious bony lesion in the lumbar spine. Signal in the visualized cord is normal. L1-2: Broad-based posterior lateral disc bulging. Mild hypertrophic facet degenerative changes. These findings produce mild spinal canal stenosis as well as mild bilateral neuroforaminal stenosis with partial effacement of the inferior neural foramen. L2-3: Broad-based posterior and lateral disc bulging, slightly asymmetric to the right. Bilateral hypertrophic facet degenerative changes, right also slightly worse than left. These findings produce mild spinal canal stenosis as well as mild/moderate right and mild left neuroforaminal stenosis. There appears to be dorsal abutment of the exiting right nerve root by the hypertrophic facet degenerative change. L3-4: Grade I anterolisthesis of L3 on L4. Broad-based posterior lateral disc bulging. Moderate bilateral facet degenerative changes with ligament flavum hypertrophy. These findings produce moderate/severe spinal canal stenosis related to both the disc bulging and ligamentum flavum hypertrophy/facet degenerative change. There is also moderate bilateral neuroforaminal stenosis, right worse than left with likely abutment of the exiting nerve roots. L4-5: Broad-based posterior and lateral disc bulging with a small left posterior paracentral disc extrusion extending slightly inferiorly along the superior margin of the L5 vertebral body. Bilateral hypertrophic facet degenerative changes. There is mild spinal canal stenosis with effacement of the left lateral recess, related to both the disc extrusion and the facet degenerative changes. This exerts mass effect upon the left traversing nerve root. There is also mild bilateral neuroforaminal stenosis. L5-S1: Grade I retrolisthesis of L5 on S1. Broad-based posterior and lateral disc bulging.  Bilateral facet degenerative changes. These findings produce partial effacement of the left greater than right lateral recess as well as mild bilateral neuroforaminal stenosis. The visualized portions of retroperitoneum are grossly unremarkable.      Impression: 1. Degenerative changes at L3-4 produce moderate/severe spinal canal stenosis as well as moderate bilateral neuroforaminal stenosis. 2. A left posterior paracentral disc extrusion at L4-5 and facet degenerative changes in the face the left lateral recess with mass effect upon the left traversing nerve root. 3. Additional neuroforaminal stenosis at L2-3 on the right. 4. Partial effacement of the bilateral L5-S1 lateral recesses. Electronically signed by:  Carlos Manuel Jose MD  10/7/2021 9:08 AM CDT Workstation: [id number]     XR Hip With or Without Pelvis 2 - 3 View Left     Result Date: 9/27/2021  Narrative: Two view left hip with single view pelvis HISTORY: Left hip pain AP and frog-leg lateral views of the left hip and AP film of the pelvis obtained. COMPARISON: Pelvis March 29, 2021 FINDINGS: No fracture or dislocation. Normal left hip. Degenerative changes right hip with some joint space narrowing and small osteophytes. Mild degenerative change each iliac crest. Degenerative disc disease L3-4. No other osseous or articular abnormality.      Impression: CONCLUSION: Normal left hip. Degenerative changes right hip with some joint space narrowing and small osteophytes. Mild degenerative change each iliac crest. Degenerative disc disease L3-4. [id number] Electronically signed by:  Eloy Gregg MD  9/27/2021 11:00 PM CDT Workstation: [id number]     XR LUMBAR SPINE MIN 4 VIEWS     Narrative     X-ray of the lumbar spine taken 9/18/2021 show degenerative scoliosis. He does have good disc space height through the lumbar vertebra until we get to L5-S1 and we see very narrowed disc space.       Date: 09/23/21   Received From: [facility]           ASSESSMENT:    Diagnoses and all orders for this visit:    Left hip pain    Spinal stenosis of lumbar region without neurogenic claudication    Facet arthropathy, lumbar    Numbness and tingling of left leg    Foraminal stenosis of lumbar region    Lumbar radiculopathy    DDD (degenerative disc disease), lumbar    Chronic left-sided low back pain with left-sided sciatica    Left leg pain    Degenerative scoliosis    Other orders  -     tiZANidine (ZANAFLEX) 4 MG tablet; Take 1 tablet by mouth Every 8 (Eight) Hours As Needed for Muscle Spasms.    PLAN    Patient is doing well overall and has improved since his last office visit.  He continues to have some residual symptoms and localized pain/tenderness in the left lower back as well as his left posterior hip/buttock.  He has some mild, intermittent pain in the distal aspect of his left lower leg but overall states he is improving.  He is ambulatory now and appears to be doing much better.  The patient has a history of similar pain/symptoms that resolved with conservative care.  He had an acute exacerbation recently of his low back pain with obvious left-sided lumbar radiculopathy and was treated conservatively again as described in the HPI above.  As previously noted, the patient has known degenerative disc disease and multilevel degenerative changes in the lumbar spine from previous MRI imaging in October 2021.  We again discussed a referral to neurosurgery at any point and certainly if he continues to have exacerbations of lumbar radiculopathy.  The patient is encouraged to notify me at any time if he is ready to be referred to neurosurgery for an evaluation.  If/when he decides he would like a referral to neurosurgery, the patient will need a repeat/updated MRI of the lumbar spine in preparation for this.  At this time, he declines this and states he is doing better.  He requests to return to work today. A return to work note is provided.  The patient  works underground as a .    Recommend the following:     -Rest and activity modification for now with avoidance of straining, lifting, pulling, tugging, etc.  -GRADUAL progression of activity as pain/symptoms allow.  -Continue with home stretching exercises.  The patient's wife is a PTA and has been assisting him with home stretching exercises.  -Continue with use of his inversion table as well as his TENS unit if these offer any improvement.  -Use of ice therapy and/or heat therapy to the low back to minimize pain/inflammation/muscle tension.  -Use of a cane or walker for modified weightbearing from the lumbar spine.  -Continue Voltaren XR daily as needed for control of joint pain/inflammation.  Patient can also take Tylenol as needed for additional pain control.  Patient has Norco 7.5 mg at home to take as needed for increased or severe pain.  He has taken only a few of these and has been taking it sparingly.  -Zanaflex is refilled for the patient today at his request to continue to take as needed for pain/muscle spasms.  Patient has previously tolerated Zanaflex well with no known adverse effects.  He is again cautioned that Zanaflex will likely cause drowsiness.    Follow-up as needed for any new, worsening or persistent symptoms.  If his pain continues/returns, consider neurosurgical referral.  Consider trial of a lumbar epidural injection of steroid.    Time spent of a minimum of 30 minutes including the face to face evaluation, reviewing of medical history and prior medial records, reviewing of diagnostic studies, prescription drug management, documentation, patient education and coordination of care.     EMR Dragon/[employer] Disclaimer: Some of this note may be an electronic transcription/translation of spoken language to printed text using the Dragon Dictation System.     Return if symptoms worsen or fail to improve.        This document has been electronically signed by AGNES Hare on  August 2, 2022 20:31 CDT      AGNES Hare

## 2022-09-20 ENCOUNTER — OFFICE VISIT (OUTPATIENT)
Dept: ORTHOPEDIC SURGERY | Facility: CLINIC | Age: 57
End: 2022-09-20

## 2022-09-20 VITALS — WEIGHT: 218 LBS | BODY MASS INDEX: 32.29 KG/M2 | HEIGHT: 69 IN

## 2022-09-20 DIAGNOSIS — M25.551 RIGHT HIP PAIN: Primary | ICD-10-CM

## 2022-09-20 DIAGNOSIS — M16.11 PRIMARY OSTEOARTHRITIS OF RIGHT HIP: ICD-10-CM

## 2022-09-20 PROCEDURE — 96372 THER/PROPH/DIAG INJ SC/IM: CPT | Performed by: NURSE PRACTITIONER

## 2022-09-20 PROCEDURE — 99213 OFFICE O/P EST LOW 20 MIN: CPT | Performed by: NURSE PRACTITIONER

## 2022-09-20 RX ORDER — KETOROLAC TROMETHAMINE 30 MG/ML
60 INJECTION, SOLUTION INTRAMUSCULAR; INTRAVENOUS ONCE
Status: COMPLETED | OUTPATIENT
Start: 2022-09-20 | End: 2022-09-20

## 2022-09-20 RX ORDER — PREDNISOLONE ACETATE 10 MG/ML
SUSPENSION/ DROPS OPHTHALMIC
COMMUNITY
Start: 2022-09-15 | End: 2022-12-06

## 2022-09-20 RX ORDER — NATAMYCIN 50 MG/ML
SUSPENSION/ DROPS OPHTHALMIC
COMMUNITY
Start: 2022-09-10 | End: 2022-12-06

## 2022-09-20 RX ORDER — TRIAMCINOLONE ACETONIDE 40 MG/ML
80 INJECTION, SUSPENSION INTRA-ARTICULAR; INTRAMUSCULAR ONCE
Status: COMPLETED | OUTPATIENT
Start: 2022-09-20 | End: 2022-09-20

## 2022-09-20 RX ORDER — NEOMYCIN SULFATE, POLYMYXIN B SULFATE AND DEXAMETHASONE 3.5; 10000; 1 MG/ML; [USP'U]/ML; MG/ML
SUSPENSION/ DROPS OPHTHALMIC
COMMUNITY
Start: 2022-08-23 | End: 2022-12-06

## 2022-09-20 RX ADMIN — KETOROLAC TROMETHAMINE 60 MG: 30 INJECTION, SOLUTION INTRAMUSCULAR; INTRAVENOUS at 08:37

## 2022-09-20 RX ADMIN — TRIAMCINOLONE ACETONIDE 80 MG: 40 INJECTION, SUSPENSION INTRA-ARTICULAR; INTRAMUSCULAR at 08:37

## 2022-09-20 NOTE — PROGRESS NOTES
Everette Mckeon is a 57 y.o. male returns for     Chief Complaint   Patient presents with   • Right Hip - Follow-up, Pain     HISTORY OF PRESENT ILLNESS: Patient presents to office for follow-up of chronic right hip pain due to known moderate to severe osteoarthritis.  Initial onset of pain occurred approximately 2-1/2 years ago with no known injury or incident.  Patient has been treated conservatively with prescription oral NSAIDs and intramuscular injections of Kenalog and Toradol, which have offered him significant pain improvement for several months at a time in the past.  Patient continues to take Voltaren and is tolerating this medication well with no known adverse effects.  Patient reports some gradual return/increase in his right hip pain in recent weeks with no known injury or incident.  Patient has increased pain with longer periods of standing and walking. 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   Patient also has a new symptom today and complains of popping sensations in the right hip when he bends over.  Patient states the popping sensations are not painful. Otherwise, he has no new complaints or concerns. No falls or injuries reported since last office visit.  Patient states he is leaving for vacation soon and is concerned about increased activity and longer periods of walking.  Patient is requesting to proceed with repeat intramuscular injections today of Toradol and Kenalog as these have offered such good pain improvement in the past.  Pain scale currently is 2/10.     CONCURRENT MEDICAL HISTORY:    The following portions of the patient's history were reviewed and updated as appropriate: allergies, current medications, past family history, past medical history, past social history, past surgical history and  "problem list.     ROS  No fevers or chills.  No chest pain or shortness of air.  No GI or  disturbances.  Right hip pain.    PHYSICAL EXAMINATION:       Ht 175.3 cm (69\")   Wt 98.9 kg (218 lb)   BMI 32.19 kg/m²     Physical Exam  Vitals reviewed.   Constitutional:       General: He is not in acute distress.     Appearance: He is well-developed. He is not ill-appearing.   HENT:      Head: Normocephalic.   Pulmonary:      Effort: Pulmonary effort is normal. No respiratory distress.   Abdominal:      General: There is no distension.      Palpations: Abdomen is soft.   Musculoskeletal:         General: No swelling, tenderness, deformity or signs of injury.   Skin:     General: Skin is warm and dry.      Capillary Refill: Capillary refill takes less than 2 seconds.      Findings: No erythema.   Neurological:      Mental Status: He is alert and oriented to person, place, and time.      GCS: GCS eye subscore is 4. GCS verbal subscore is 5. GCS motor subscore is 6.      Sensory: No sensory deficit.   Psychiatric:         Speech: Speech normal.         Behavior: Behavior normal.         Thought Content: Thought content normal.         Judgment: Judgment normal.         GAIT:     []  Normal  [x]  Antalgic (mild)    Assistive device: [x]  None  []  Walker     []  Crutches  []  Cane     []  Wheelchair  []  Stretcher    Right Hip Exam     Tenderness   The patient is experiencing no tenderness.     Range of Motion   Abduction: 30   Flexion: 100   External rotation: 50   Internal rotation: 5     Muscle Strength   The patient has normal right hip strength.    Tests   DEE: positive  Fadir:  Positive FADIR test    Other   Erythema: absent  Sensation: normal  Pulse: present    Comments:  Pain and limitations with range of motion.  No deformity.  No significant leg length discrepancy noted.  No tenderness to palpation.  No swelling appreciated.  No erythema.  No warmth.  No signs of infection noted.              EXAMINATION:  XR " HIP W OR WO PELVIS 2-3 VIEW RIGHT     CLINICAL HISTORY:  56 years Male,pain, M25.551 Pain in right hip     COMPARISON:  9/27/2021 plain films     FINDINGS:  Degenerative arthritis on the right hip with near  complete loss of joint space. There is associated subchondral  sclerosis and cystic change as well as marginal hypertrophic  osteophytes. More mild degenerative arthritis about the left hip  with relative preservation of joint space. No acute fracture. No  dislocation.     IMPRESSION:  Moderate/advanced degenerative arthritis involving  the right hip.     Electronically signed by:  Carlos Manuel Jose MD  5/19/2022 8:46 AM CDT  Workstation: [id number]    ASSESSMENT:    Diagnoses and all orders for this visit:    Right hip pain  -     ketorolac (TORADOL) injection 60 mg  -     triamcinolone acetonide (KENALOG-40) injection 80 mg    Primary osteoarthritis of right hip    PLAN    Patient complains of gradually worsening/returning right hip pain, which has been an ongoing issue for the past few years.  Patient has known moderate to severe osteoarthritic changes in the right hip joint.  Subjective complaints and physical exam remain consistent with osteoarthritic right hip pain.  The patient has also been treated for lumbar radiculopathy affecting his left lower extremity, but this has improved with conservative care.  His current pain/symptoms are not suspicious for right-sided lumbar radiculopathy.  Pain can be reproduced with motion of his hip joint.  We again discussed conservative treatment options versus eventual surgical consult for right total hip arthroplasty.  At this time, the patient is not interested in surgical consult and wants to continue with conservative care.  We again discussed the option of a trial of an intra-articular injection of steroid into the right hip joint for management of his osteoarthritic pain.  Patient again states that he is open to this option at some point but for now he only wants to  proceed with repeat intramuscular injections today as they have offered him significant pain improvement in the past.  The patient is leaving for vacation soon and has concerns about increasing pain with longer periods of weightbearing activity.  Recommend intramuscular injections of Toradol 60 mg and Kenalog 80 mg today for management of joint pain/inflammation.  We also discussed that he can call the office and notify me if these injections are not sufficient for pain relief and if he would like to proceed with the option of an intra-articular injection into his right hip joint.    Recommend the following:     -Rest and activity modification as tolerated and based on pain.  -Use of a cane or walker as needed for modified weightbearing off the right hip.   -Gradual progression of weightbearing and activity as pain allows.   -Ice therapy to the affected hip as needed to minimize pain/inflammation.   -Continue with Voltaren XR daily as needed for management of joint pain/inflammation.  Patient can also take Tylenol as needed for additional pain control.    Follow-up as needed for any new, worsening or persistent symptoms.  We also discussed that if the intramuscular injections are ineffective for pain relief, he can call the office and requests to proceed with the intra-articular injection of steroid as we discussed and I will be happy to arrange that for him.    Time spent of a minimum of 20 minutes including the face to face evaluation, reviewing of medical history and prior medial records, reviewing of diagnostic studies, documentation, patient education and coordination of care.     EMR Dragon/Transciption Disclaimer: Some of this note may be an electronic transcription/translation of spoken language to printed text using the Dragon Dictation System.     Return if symptoms worsen or fail to improve.        This document has been electronically signed by AGNES Hare on September 22, 2022 19:57 CDT       Fela Kelly, APRN

## 2022-10-12 ENCOUNTER — OFFICE VISIT (OUTPATIENT)
Dept: SLEEP MEDICINE | Facility: HOSPITAL | Age: 57
End: 2022-10-12

## 2022-10-12 VITALS
WEIGHT: 221 LBS | BODY MASS INDEX: 32.73 KG/M2 | DIASTOLIC BLOOD PRESSURE: 91 MMHG | SYSTOLIC BLOOD PRESSURE: 144 MMHG | HEIGHT: 69 IN | HEART RATE: 77 BPM | OXYGEN SATURATION: 96 %

## 2022-10-12 DIAGNOSIS — G47.33 OBSTRUCTIVE SLEEP APNEA: Primary | ICD-10-CM

## 2022-10-12 DIAGNOSIS — G25.81 RESTLESS LEGS SYNDROME: ICD-10-CM

## 2022-10-12 PROCEDURE — 99213 OFFICE O/P EST LOW 20 MIN: CPT | Performed by: NURSE PRACTITIONER

## 2022-10-12 NOTE — PROGRESS NOTES
Sleep Clinic Follow Up    Date: 10/12/2022  Primary Care Provider: Provider, No Known    Last office visit: 10/12/2021 (I reviewed this note)    CC: Follow up: SHANNEN on CPAP      Interim History:  Since the last visit:    1) severe SHANNEN -  Everette Mckeon has remained compliant with CPAP. He denies mask and machine issues, dry mouth, headaches, or pressures intolerance. He denies abnormal dreams, sleep paralysis, nasal congestion, URI sx. He has received his replacement machine.    2) Patient reports occasional RLS symptoms, less than before. He also has nocturnal leg cramping 3 times per week.     Sleep Testin. HST on 2019, AHI of 64.3   2. CPAP titration on 2019, recommended 10-20 cm H2O   3. Currently on 10-20 cm H2O    PAP Data:    Time frame: 10/10/2021-10/07/2022   Compliance: 93.7%  Average use on days used: 4 hrs 41 min  Percent of days with usage greater than or equal to 4 hours: 59.5%  PAP range: 10-20 cm H2O  Average 90% pressure: 11 cmH2O  Leak: 0 minutes  Average AHI: 1.0 events/hr  Mask type: Nasal mask  DME: [facility]  Machine type: [employer] DreamStation 2    Bed time: 1000  Sleep latency: 30 minutes  Number of times awakens during the night: 1  Wake time: 1600  Estimated total sleep time at night: 4-5 hours  Caffeine intake: 2 cups of coffee, 1 cups of tea, and 0 sodas per day  Alcohol intake: 0 drinks per week  Nap time: none/rare   Sleepiness with Driving: none     [place] - 6  [place] Sleepiness Scale  Sitting and reading: Slight chance of dozing  Watching TV: Slight chance of dozing  Sitting, inactive in a public place (e.g. a theatre or a meeting): Would never doze  As a passenger in a car for an hour without a break: Slight chance of dozing  Lying down to rest in the afternoon when circumstances permit: Moderate chance of dozing  Sitting and talking to someone: Would never doze  Sitting quietly after a lunch without alcohol: Slight chance of dozing  In a car, while  stopped for a few minutes in traffic: Would never doze  Total score: 6    PMHx, FH, SH reviewed and pertinent changes are: Started Voltaren tablet.    Review of Systems:   Negative for chest pain, SOA, fever, chills, cough, N/V/D, abdominal pain.    Smoking:none    Everette Mckeon  reports that he has never smoked. He has never used smokeless tobacco.      Physical Exam:  Vitals:    10/12/22 0813   BP: 144/91   Pulse: 77   SpO2: 96%           10/12/22  0813   Weight: 100 kg (221 lb)     Body mass index is 32.62 kg/m². BMI is >= 30 and <35. (Class 1 Obesity). The following options were offered after discussion;: referral to primary care    Gen:                No distress, conversant, pleasant, appears stated age, alert, oriented  Eyes:               Anicteric sclera, moist conjunctiva, no lid lag                           PERRL, EOMI   Heent:             NC/AT                          Oropharynx clear                          Normal hearing  Lungs:             Normal effort, non-labored breathing         CV:                  Normal S1/S2                          No lower extremity edema  ABD:               Soft, rounded, non-distended             Psych:             Appropriate affect  Neuro:             CN 2-12 appear intact    Past Medical History:   Diagnosis Date   • Degenerative scoliosis    • History of stomach ulcers    • Primary osteoarthritis of right hip 3/29/2021   • Sleep apnea        Current Outpatient Medications:   •  diclofenac sodium (VOTAREN XR) 100 MG 24 hr tablet, TAKE 1 TABLET BY MOUTH DAILY, Disp: 30 tablet, Rfl: 1  •  lisinopril (PRINIVIL,ZESTRIL) 10 MG tablet, , Disp: , Rfl:   •  Natacyn 5 % ophthalmic solution, SHAKE LIQUID AND INSTILL 1 DROP IN LEFT EYE EVERY HOUR WHILE AWAKE, Disp: , Rfl:   •  neomycin-polymyxin-dexamethasone (MAXITROL) 3.5-01588-5.1 ophthalmic suspension, INSTILL 1 DROP IN LEFT EYE FOUR TIMES DAILY AS DIRECTED, Disp: , Rfl:   •  omeprazole (priLOSEC) 20 MG capsule, Take  20 mg by mouth Daily., Disp: , Rfl:   •  prednisoLONE acetate (PRED FORTE) 1 % ophthalmic suspension, INSTILL 1 DROP INTO LEFT EYE FOUR TIMES DAILY, Disp: , Rfl:   •  tiZANidine (ZANAFLEX) 4 MG tablet, Take 1 tablet by mouth Every 8 (Eight) Hours As Needed for Muscle Spasms., Disp: 90 tablet, Rfl: 1    WBC   Date Value Ref Range Status   09/26/2014 12.3 (H) 3.2 - 9.8 x1000/uL Final     RBC   Date Value Ref Range Status   09/26/2014 4.68 4.37 - 5.74 christiano/mm3 Final     Hemoglobin   Date Value Ref Range Status   09/26/2014 13.7 13.7 - 17.3 gm/dl Final     Hematocrit   Date Value Ref Range Status   09/26/2014 41.4 39.0 - 49.0 % Final     MCV   Date Value Ref Range Status   09/26/2014 88.5 80.0 - 98.0 fl Final     MCH   Date Value Ref Range Status   09/26/2014 29.3 26.0 - 34.0 pg Final     MCHC   Date Value Ref Range Status   09/26/2014 33.1 31.5 - 36.3 gm/dl Final     RDW   Date Value Ref Range Status   09/26/2014 12.9 11.5 - 14.5 % Final     MPV   Date Value Ref Range Status   09/26/2014 12.1 (H) 8.0 - 12.0 fl Final     Platelets   Date Value Ref Range Status   09/26/2014 193 150 - 450 x1000/mm3 Final     Neutrophil Rel %   Date Value Ref Range Status   09/26/2014 82.5 (H) 37.0 - 80.0 % Final     Lymphocyte Rel %   Date Value Ref Range Status   09/26/2014 10.0 10.0 - 50.0 % Final     Monocyte Rel %   Date Value Ref Range Status   09/26/2014 6.6 0.0 - 12.0 % Final     Eosinophil Rel %   Date Value Ref Range Status   09/26/2014 0.6 0.0 - 7.0 % Final     Basophil Rel %   Date Value Ref Range Status   09/26/2014 0.1 0.0 - 2.0 % Final     Immature Granulocyte Rel %   Date Value Ref Range Status   09/26/2014 0.20 0.00 - 0.50 % Final     Neutrophils Absolute   Date Value Ref Range Status   09/26/2014 10.15 (H) 2.00 - 8.60 x1000/uL Final     Lymphocytes Absolute   Date Value Ref Range Status   09/26/2014 1.23 0.60 - 4.20 x1000/uL Final     Monocytes Absolute   Date Value Ref Range Status   09/26/2014 0.81 0.00 - 0.90 x1000/uL  Final     Eosinophils Absolute   Date Value Ref Range Status   09/26/2014 0.07 0.00 - 0.70 x1000/uL Final     Basophils Absolute   Date Value Ref Range Status   09/26/2014 0.01 0.00 - 0.20 x1000/uL Final     Immature Granulocytes Absolute   Date Value Ref Range Status   09/26/2014 0.030 (H) 0.005 - 0.022 x1000/uL Final       Lab Results   Component Value Date    GLUCOSE 103 (H) 09/26/2014    BUN 14 09/26/2014    CREATININE 1.0 09/26/2014    K 4.0 09/26/2014    CO2 32 (H) 09/26/2014    CALCIUM 8.8 09/26/2014    ALBUMIN 3.9 09/26/2014    AST 20 09/26/2014    ALT 32 09/26/2014       Assessment and Plan:    1. Obstructive sleep apnea - Established, stable (1)  1. Compliant with PAP therapy  2. Continue PAP as prescribed  3. Script for PAP supplies  4. Return to clinic in 1 year with compliance report unless change in symptoms in interim period  2. Restless leg syndrome/Periodic limb movement disorder (RLS/PLMD) - self-limited or minor problem   1. Follow up as needed      I spent 20 minutes caring for Everette on this date of service. This time includes time spent by me in the following activities: preparing for the visit, reviewing tests, obtaining and/or reviewing a separately obtained history, performing a medically appropriate examination and/or evaluation , counseling and educating the patient/family/caregiver, documenting information in the medical record and care coordination; discussing PAP therapy, PAP compliance and PAP maintenance    RTC in 12 months. Patient agrees to return sooner if changes in symptoms.        This document has been electronically signed by AGNES Yousif on October 12, 2022 08:32 CDT          CC: Provider, No Known          No ref. provider found

## 2022-12-06 ENCOUNTER — OFFICE VISIT (OUTPATIENT)
Dept: ORTHOPEDIC SURGERY | Facility: CLINIC | Age: 57
End: 2022-12-06

## 2022-12-06 VITALS — HEIGHT: 69 IN | BODY MASS INDEX: 31.99 KG/M2 | WEIGHT: 216 LBS

## 2022-12-06 DIAGNOSIS — M54.42 CHRONIC LEFT-SIDED LOW BACK PAIN WITH LEFT-SIDED SCIATICA: ICD-10-CM

## 2022-12-06 DIAGNOSIS — G89.29 CHRONIC LEFT-SIDED LOW BACK PAIN WITH LEFT-SIDED SCIATICA: ICD-10-CM

## 2022-12-06 DIAGNOSIS — M25.552 LEFT HIP PAIN: Primary | ICD-10-CM

## 2022-12-06 DIAGNOSIS — R20.0 NUMBNESS AND TINGLING OF LEFT LEG: ICD-10-CM

## 2022-12-06 DIAGNOSIS — M41.50 DEGENERATIVE SCOLIOSIS: ICD-10-CM

## 2022-12-06 DIAGNOSIS — M79.605 LEFT LEG PAIN: ICD-10-CM

## 2022-12-06 DIAGNOSIS — M48.061 FORAMINAL STENOSIS OF LUMBAR REGION: ICD-10-CM

## 2022-12-06 DIAGNOSIS — M54.16 LUMBAR RADICULOPATHY: ICD-10-CM

## 2022-12-06 DIAGNOSIS — M48.061 SPINAL STENOSIS OF LUMBAR REGION WITHOUT NEUROGENIC CLAUDICATION: ICD-10-CM

## 2022-12-06 DIAGNOSIS — M47.816 FACET ARTHROPATHY, LUMBAR: ICD-10-CM

## 2022-12-06 DIAGNOSIS — R20.2 NUMBNESS AND TINGLING OF LEFT LEG: ICD-10-CM

## 2022-12-06 DIAGNOSIS — M51.36 DDD (DEGENERATIVE DISC DISEASE), LUMBAR: ICD-10-CM

## 2022-12-06 PROCEDURE — 99214 OFFICE O/P EST MOD 30 MIN: CPT | Performed by: NURSE PRACTITIONER

## 2022-12-06 RX ORDER — PREDNISONE 10 MG/1
20 TABLET ORAL DAILY
Qty: 15 TABLET | Refills: 0 | Status: SHIPPED | OUTPATIENT
Start: 2022-12-06 | End: 2023-01-17

## 2022-12-06 RX ORDER — HYDROCODONE BITARTRATE AND ACETAMINOPHEN 7.5; 325 MG/1; MG/1
1 TABLET ORAL EVERY 6 HOURS PRN
Qty: 40 TABLET | Refills: 0 | Status: SHIPPED | OUTPATIENT
Start: 2022-12-06 | End: 2022-12-16

## 2022-12-06 NOTE — PROGRESS NOTES
Everette Mckeon is a 57 y.o. male returns for     Chief Complaint   Patient presents with   • Left Hip - Follow-up, Pain   • Lumbar Spine - Pain, Follow-up     HISTORY OF PRESENT ILLNESS:     Patient presents to office for follow-up of chronic/recurrent low back pain with pain that radiates into the left hip/buttock and down his left leg.  Initial onset of pain occurred abruptly on 8/20/2021 after bending over at work.  At that time, the patient was experiencing severe left-sided low back pain with pain radiating throughout his left leg as well. MRI imaging performed in October 2021 showed evidence of moderate to severe spinal stenosis as well as moderate bilateral foraminal stenosis at L3-L4. Also, patient had evidence of a left-sided disc protrusion at L4-L5 resulting in a mass-effect and nerve compression on the left side.     Patient experienced an acute exacerbation of similar pain/symptoms in July 2022.  With each exacerbation of left-sided low back pain and left-sided lumbar radiculopathy, the patient has been treated conservatively with intramuscular injections of Kenalog and Toradol, oral prednisone, prescription anti-inflammatory medications (Voltaren), muscle relaxants (Zanaflex), pain medication (Norco), rest/activity modification, ice therapy, heat therapy, use of a TENS unit, home stretching exercises with the assistance of his wife who is a physical therapy assistant and a concentrated period of time off from work.  Patient has experienced improvement and sometimes resolution of his pain/symptoms with conservative management.    However, patient presents to office today with complaints of another acute exacerbation of left-sided low back pain and left leg pain.  Onset of increased pain and symptoms occurred 1-1/2 weeks ago with no known injury.  Patient states it occurred acutely and suddenly when he stepped up onto a step with his left leg.  Patient again is experiencing moderate to severe pain in  "the left lower back with pain that radiates into the left buttock and into his left leg.  In regards to the left leg pain, patient states he primarily feels the pain in the distal aspect of his lower leg and foot.  He describes the pain as burning in nature with sensations of squeezing and tightness.  Patient states he was initially having difficulty ambulating and bearing weight on the left leg but that has improved somewhat.  Patient states the pain is much worse with standing but also increases with walking and sitting.  The patient is leaning to the right to avoid sitting upright in office today due to the pain.  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  No new complaints or concerns noted.  No falls or injuries reported.  X-rays of the left hip/pelvis performed in office today. Pain scale currently is 6/10.     Patient indicates today that he wants to proceed with neurosurgical referral as we have previously discussed since he has continued to have recurrent exacerbations of lumbar radiculopathy, which are affecting his daily activities and quality of life.     CONCURRENT MEDICAL HISTORY:    The following portions of the patient's history were reviewed and updated as appropriate: allergies, current medications, past family history, past medical history, past social history, past surgical history and problem list.     ROS  No fevers or chills.  No chest pain or shortness of air.  No GI or  disturbances.  Low back pain.  Left hip pain.  Left leg pain.    PHYSICAL EXAMINATION:       Ht 175.3 cm (69\")   Wt 98 kg (216 lb)   BMI 31.90 kg/m²     Physical Exam  Vitals reviewed.   Constitutional:       General: He is not in acute distress.     Appearance: He is well-developed. He is not ill-appearing.   HENT:      Head: Normocephalic.   Pulmonary:      Effort: Pulmonary effort is normal. No " respiratory distress.   Abdominal:      General: There is no distension.      Palpations: Abdomen is soft.   Musculoskeletal:         General: Tenderness (Lumbar spine) present. No swelling, deformity or signs of injury.      Lumbar back: Positive left straight leg raise test. Negative right straight leg raise test.   Skin:     General: Skin is warm and dry.      Capillary Refill: Capillary refill takes less than 2 seconds.      Findings: No erythema.   Neurological:      Mental Status: He is alert and oriented to person, place, and time.      GCS: GCS eye subscore is 4. GCS verbal subscore is 5. GCS motor subscore is 6.      Sensory: Sensory deficit (Left leg/foot) present.   Psychiatric:         Speech: Speech normal.         Behavior: Behavior normal.         Thought Content: Thought content normal.         Judgment: Judgment normal.         GAIT:     []  Normal  [x]  Antalgic    Assistive device: [x]  None  []  Walker     []  Crutches  []  Cane     []  Wheelchair  []  Stretcher    Back Exam     Tenderness   The patient is experiencing tenderness in the lumbar.    Range of Motion   Extension: abnormal   Flexion: abnormal   Rotation right: abnormal   Rotation left: abnormal     Muscle Strength   Right Quadriceps:  4/5   Left Quadriceps:  4/5     Tests   Straight leg raise right: negative  Straight leg raise left: positive    Other   Sensation: decreased (LLE)  Gait: antalgic   Erythema: no back redness            MRI Lumbar Spine Without Contrast     Result Date: 10/7/2021  Narrative: MRI LUMBAR SPINE CLINICAL HISTORY:  56 years Male,Low back pain with left hip/buttock pain radiating down left leg with numbness and tingling?evaluate for nerve compression/lumbar radiculopathy; no injury., M25.552 Pain in left hip M54.16 Radiculopathy, lumbar region M79.605 Pain in left leg M51.36 Other intervertebral disc degeneration, lumbar region M41.80 Other forms of scoliosis, site unspecified R20.0 Anesthesia of skin R20.2  Paresthesia of COMPARISON:  None TECHNIQUE: Sagittal and axial images were obtained without intravenous contrast. FINDINGS: Mild, grade I anterolisthesis of L3 on L4. Grade I retrolisthesis of L5 on S1. No acute fracture. No suspicious bony lesion in the lumbar spine. Signal in the visualized cord is normal. L1-2: Broad-based posterior lateral disc bulging. Mild hypertrophic facet degenerative changes. These findings produce mild spinal canal stenosis as well as mild bilateral neuroforaminal stenosis with partial effacement of the inferior neural foramen. L2-3: Broad-based posterior and lateral disc bulging, slightly asymmetric to the right. Bilateral hypertrophic facet degenerative changes, right also slightly worse than left. These findings produce mild spinal canal stenosis as well as mild/moderate right and mild left neuroforaminal stenosis. There appears to be dorsal abutment of the exiting right nerve root by the hypertrophic facet degenerative change. L3-4: Grade I anterolisthesis of L3 on L4. Broad-based posterior lateral disc bulging. Moderate bilateral facet degenerative changes with ligament flavum hypertrophy. These findings produce moderate/severe spinal canal stenosis related to both the disc bulging and ligamentum flavum hypertrophy/facet degenerative change. There is also moderate bilateral neuroforaminal stenosis, right worse than left with likely abutment of the exiting nerve roots. L4-5: Broad-based posterior and lateral disc bulging with a small left posterior paracentral disc extrusion extending slightly inferiorly along the superior margin of the L5 vertebral body. Bilateral hypertrophic facet degenerative changes. There is mild spinal canal stenosis with effacement of the left lateral recess, related to both the disc extrusion and the facet degenerative changes. This exerts mass effect upon the left traversing nerve root. There is also mild bilateral neuroforaminal stenosis. L5-S1: Grade I  retrolisthesis of L5 on S1. Broad-based posterior and lateral disc bulging. Bilateral facet degenerative changes. These findings produce partial effacement of the left greater than right lateral recess as well as mild bilateral neuroforaminal stenosis. The visualized portions of retroperitoneum are grossly unremarkable.      Impression: 1. Degenerative changes at L3-4 produce moderate/severe spinal canal stenosis as well as moderate bilateral neuroforaminal stenosis. 2. A left posterior paracentral disc extrusion at L4-5 and facet degenerative changes in the face the left lateral recess with mass effect upon the left traversing nerve root. 3. Additional neuroforaminal stenosis at L2-3 on the right. 4. Partial effacement of the bilateral L5-S1 lateral recesses. Electronically signed by:  Carlos Manuel Jose MD  10/7/2021 9:08 AM CDT Workstation: [id number]     XR Hip With or Without Pelvis 2 - 3 View Left     Result Date: 9/27/2021  Narrative: Two view left hip with single view pelvis HISTORY: Left hip pain AP and frog-leg lateral views of the left hip and AP film of the pelvis obtained. COMPARISON: Pelvis March 29, 2021 FINDINGS: No fracture or dislocation. Normal left hip. Degenerative changes right hip with some joint space narrowing and small osteophytes. Mild degenerative change each iliac crest. Degenerative disc disease L3-4. No other osseous or articular abnormality.      Impression: CONCLUSION: Normal left hip. Degenerative changes right hip with some joint space narrowing and small osteophytes. Mild degenerative change each iliac crest. Degenerative disc disease L3-4. [id number] Electronically signed by:  Eloy Gregg MD  9/27/2021 11:00 PM CDT Workstation: [id number]     XR LUMBAR SPINE MIN 4 VIEWS     Narrative     X-ray of the lumbar spine taken 9/18/2021 show degenerative scoliosis. He does have good disc space height through the lumbar vertebra until we get to L5-S1 and we see very narrowed disc space.        Date: 09/23/21   Received From: [facility]          ASSESSMENT:    Diagnoses and all orders for this visit:    Left hip pain  -     XR Hip With or Without Pelvis 2 - 3 View Left; Future  -     HYDROcodone-acetaminophen (NORCO) 7.5-325 MG per tablet; Take 1 tablet by mouth Every 6 (Six) Hours As Needed for Moderate Pain for up to 10 days.  -     Ambulatory Referral to Neurosurgery    Spinal stenosis of lumbar region without neurogenic claudication  -     HYDROcodone-acetaminophen (NORCO) 7.5-325 MG per tablet; Take 1 tablet by mouth Every 6 (Six) Hours As Needed for Moderate Pain for up to 10 days.  -     MRI Lumbar Spine Without Contrast; Future  -     Ambulatory Referral to Neurosurgery    Facet arthropathy, lumbar  -     HYDROcodone-acetaminophen (NORCO) 7.5-325 MG per tablet; Take 1 tablet by mouth Every 6 (Six) Hours As Needed for Moderate Pain for up to 10 days.  -     MRI Lumbar Spine Without Contrast; Future  -     Ambulatory Referral to Neurosurgery    Numbness and tingling of left leg  -     HYDROcodone-acetaminophen (NORCO) 7.5-325 MG per tablet; Take 1 tablet by mouth Every 6 (Six) Hours As Needed for Moderate Pain for up to 10 days.  -     MRI Lumbar Spine Without Contrast; Future  -     Ambulatory Referral to Neurosurgery    Foraminal stenosis of lumbar region  -     HYDROcodone-acetaminophen (NORCO) 7.5-325 MG per tablet; Take 1 tablet by mouth Every 6 (Six) Hours As Needed for Moderate Pain for up to 10 days.  -     MRI Lumbar Spine Without Contrast; Future  -     Ambulatory Referral to Neurosurgery    Lumbar radiculopathy  -     HYDROcodone-acetaminophen (NORCO) 7.5-325 MG per tablet; Take 1 tablet by mouth Every 6 (Six) Hours As Needed for Moderate Pain for up to 10 days.  -     MRI Lumbar Spine Without Contrast; Future  -     Ambulatory Referral to Neurosurgery    DDD (degenerative disc disease), lumbar  -     HYDROcodone-acetaminophen (NORCO) 7.5-325 MG per tablet; Take 1 tablet by  mouth Every 6 (Six) Hours As Needed for Moderate Pain for up to 10 days.  -     MRI Lumbar Spine Without Contrast; Future  -     Ambulatory Referral to Neurosurgery    Chronic left-sided low back pain with left-sided sciatica  -     HYDROcodone-acetaminophen (NORCO) 7.5-325 MG per tablet; Take 1 tablet by mouth Every 6 (Six) Hours As Needed for Moderate Pain for up to 10 days.  -     MRI Lumbar Spine Without Contrast; Future  -     Ambulatory Referral to Neurosurgery    Left leg pain  -     HYDROcodone-acetaminophen (NORCO) 7.5-325 MG per tablet; Take 1 tablet by mouth Every 6 (Six) Hours As Needed for Moderate Pain for up to 10 days.  -     MRI Lumbar Spine Without Contrast; Future  -     Ambulatory Referral to Neurosurgery    Degenerative scoliosis  -     HYDROcodone-acetaminophen (NORCO) 7.5-325 MG per tablet; Take 1 tablet by mouth Every 6 (Six) Hours As Needed for Moderate Pain for up to 10 days.  -     MRI Lumbar Spine Without Contrast; Future  -     Ambulatory Referral to Neurosurgery    Other orders  -     predniSONE (DELTASONE) 10 MG tablet; Take 2 tablets by mouth Daily. For 5 days then 1 tablet daily for 5 days    PLAN     X-rays of the left hip/pelvis performed in office today and reviewed with no acute findings noted.  There are some mild degenerative changes in the left hip joint with subchondral sclerotic changes at the superior rim of the acetabulum but overall the femoral head is round and smooth and joint spacing is well-preserved.  On the pelvic view, there is redemonstration of advanced osteoarthritic changes with loss of joint spacing and progressive flattening and deformity of the femoral head when compared to prior images from May 2022.  However, his right hip issues are chronic in nature and he does not currently complain of right hip pain and states overall his right hip is doing well.  He anticipates he will eventually need right total hip arthroplasty in the future but for now his right  hip pain is stable.    Patient complains today of an acute exacerbation of left-sided low back pain that radiates into the posterior left hip/buttock and down his left leg.  In regards to the left leg pain, patient states he primarily is experiencing pain in the distal aspect of his lower leg and into the foot.  Patient has experienced similar symptoms in the past due to lumbar radiculopathy.  Previous MRI imaging performed in October 2021 showed evidence of moderate to severe spinal stenosis and moderate bilateral foraminal stenosis at the level of L3-L4.  The patient also had evidence at that time of a left-sided disc protrusion at L4-L5, resulting in mass-effect and nerve compression on the left side, which has certainly correlated with his recurrent pain/symptoms.  In the past, the patient has been treated conservatively as described in the HPI above and typically has gradual improvement in his pain/symptoms.  However, we had previously discussed referral to neurosurgery for more expert opinion, possible surgical consult for more definitive resolution of his issues.  Patient requested a referral to neurosurgery as we had previously discussed based on the recurrence of his pain as he has had 3 episodes of significant lumbar radiculopathy pain/symptoms in the past 14 months.    The patient will need an updated MRI of the lumbar spine in preparation for his neurosurgical appointment/referral.  The patient is instructed to take a copy of his MRI images on disc with him to the appointment.  The patient understands he will receive a telephone call in the near future to schedule this referral appointment.  The patient requests a referral to [facility] neurosurgery.    Recommend the following:     -Rest and activity modification for now with avoidance of straining, lifting, pulling, tugging, etc.  -Use of ice therapy and/or heat therapy to the low back to minimize pain/inflammation/muscle tension.  -Use of a  cane or walker for modified weightbearing from the lumbar spine.   -Continue with home stretching exercises if the patient finds these beneficial (as long as it does not increase his pain).  -Continue with use of a TENS unit if this offers any pain relief.  -Continue Voltaren XR daily as needed for control of joint pain/inflammation.  Patient can also take Tylenol as needed for additional pain control.  -Continue with Zanaflex 4 mg as needed for pain/muscle spasms.     Recommend a repeat intramuscular injection of Toradol 60 mg today for further management of pain/inflammation.  A 10-day course of tapered prednisone is also prescribed today for continued management of pain/inflammation and lumbar radiculopathy.  We did not repeat an intramuscular injection of Kenalog today as he recently had an intramuscular injection of steroid about 1 month ago for respiratory complaints at a different facility.    Norco 7.5 mg is refilled for the patient today to continue to take as needed for moderate to severe pain that is not controlled with nonopioid pain management methods.  The patient takes pain medication very sparingly as previously directed.  He has not had a prescription since July 2022 and states he still has a couple of those left.  Patient is encouraged to continue to focus on nonopioid pain management methods as much as possible.  DIAMOND is reviewed internally via Epic and is noted to be appropriate.    Patient can follow-up on an as-needed basis but otherwise he should follow-up with neurosurgery as we discussed today.     Time spent of a minimum of 30 minutes including the face to face evaluation, reviewing of medical history and prior medial records, reviewing of diagnostic studies, ordering additional tests, prescription drug management, documentation, patient education and coordination of care.     EMR Dragon/[email or web address] Disclaimer: Some of this note may be an electronic transcription/translation of spoken  language to printed text using the Dragon Dictation System.     Return if symptoms worsen or fail to improve.        This document has been electronically signed by AGNES Hare on December 6, 2022 12:16 CST      AGNES Hare

## 2022-12-09 ENCOUNTER — HOSPITAL ENCOUNTER (OUTPATIENT)
Dept: MRI IMAGING | Facility: HOSPITAL | Age: 57
Discharge: HOME | End: 2022-12-09
Admitting: NURSE PRACTITIONER

## 2022-12-09 DIAGNOSIS — M48.061 SPINAL STENOSIS OF LUMBAR REGION WITHOUT NEUROGENIC CLAUDICATION: ICD-10-CM

## 2022-12-09 DIAGNOSIS — R20.0 NUMBNESS AND TINGLING OF LEFT LEG: ICD-10-CM

## 2022-12-09 DIAGNOSIS — M48.061 FORAMINAL STENOSIS OF LUMBAR REGION: ICD-10-CM

## 2022-12-09 DIAGNOSIS — M54.16 LUMBAR RADICULOPATHY: ICD-10-CM

## 2022-12-09 DIAGNOSIS — M79.605 LEFT LEG PAIN: ICD-10-CM

## 2022-12-09 DIAGNOSIS — M54.42 CHRONIC LEFT-SIDED LOW BACK PAIN WITH LEFT-SIDED SCIATICA: ICD-10-CM

## 2022-12-09 DIAGNOSIS — M51.36 DDD (DEGENERATIVE DISC DISEASE), LUMBAR: ICD-10-CM

## 2022-12-09 DIAGNOSIS — M41.50 DEGENERATIVE SCOLIOSIS: ICD-10-CM

## 2022-12-09 DIAGNOSIS — M47.816 FACET ARTHROPATHY, LUMBAR: ICD-10-CM

## 2022-12-09 DIAGNOSIS — G89.29 CHRONIC LEFT-SIDED LOW BACK PAIN WITH LEFT-SIDED SCIATICA: ICD-10-CM

## 2022-12-09 DIAGNOSIS — R20.2 NUMBNESS AND TINGLING OF LEFT LEG: ICD-10-CM

## 2022-12-09 PROCEDURE — 72148 MRI LUMBAR SPINE W/O DYE: CPT

## 2022-12-21 ENCOUNTER — TELEPHONE (OUTPATIENT)
Dept: ORTHOPEDIC SURGERY | Facility: CLINIC | Age: 57
End: 2022-12-21

## 2022-12-21 DIAGNOSIS — M48.061 SPINAL STENOSIS OF LUMBAR REGION WITHOUT NEUROGENIC CLAUDICATION: ICD-10-CM

## 2022-12-21 DIAGNOSIS — M25.552 LEFT HIP PAIN: Primary | ICD-10-CM

## 2022-12-21 DIAGNOSIS — M48.061 FORAMINAL STENOSIS OF LUMBAR REGION: ICD-10-CM

## 2022-12-21 DIAGNOSIS — M51.36 DDD (DEGENERATIVE DISC DISEASE), LUMBAR: ICD-10-CM

## 2022-12-21 DIAGNOSIS — R20.2 NUMBNESS AND TINGLING OF LEFT LEG: ICD-10-CM

## 2022-12-21 DIAGNOSIS — M47.816 FACET ARTHROPATHY, LUMBAR: ICD-10-CM

## 2022-12-21 DIAGNOSIS — M54.16 LUMBAR RADICULOPATHY: ICD-10-CM

## 2022-12-21 DIAGNOSIS — R20.0 NUMBNESS AND TINGLING OF LEFT LEG: ICD-10-CM

## 2022-12-21 NOTE — TELEPHONE ENCOUNTER
GRABIEL.  [facility] CALLED REQUESTING A REFERRAL FOR A NEUROSURGEON BE SENT TO [facility].  THEY HAVE A CONTRACT WITH THEM

## 2022-12-27 NOTE — TELEPHONE ENCOUNTER
GRABIEL,    Patient called requesting refill for Diclofenac sent to [facility] in [place].

## 2023-01-17 ENCOUNTER — OFFICE VISIT (OUTPATIENT)
Dept: ORTHOPEDIC SURGERY | Facility: CLINIC | Age: 58
End: 2023-01-17
Payer: COMMERCIAL

## 2023-01-17 VITALS — HEIGHT: 69 IN | WEIGHT: 219 LBS | BODY MASS INDEX: 32.44 KG/M2

## 2023-01-17 DIAGNOSIS — M25.551 RIGHT HIP PAIN: Primary | ICD-10-CM

## 2023-01-17 DIAGNOSIS — M16.11 PRIMARY OSTEOARTHRITIS OF RIGHT HIP: ICD-10-CM

## 2023-01-17 PROCEDURE — 96372 THER/PROPH/DIAG INJ SC/IM: CPT | Performed by: NURSE PRACTITIONER

## 2023-01-17 PROCEDURE — 99213 OFFICE O/P EST LOW 20 MIN: CPT | Performed by: NURSE PRACTITIONER

## 2023-01-17 RX ORDER — TRIAMCINOLONE ACETONIDE 40 MG/ML
80 INJECTION, SUSPENSION INTRA-ARTICULAR; INTRAMUSCULAR ONCE
Status: COMPLETED | OUTPATIENT
Start: 2023-01-17 | End: 2023-01-17

## 2023-01-17 RX ORDER — KETOROLAC TROMETHAMINE 30 MG/ML
60 INJECTION, SOLUTION INTRAMUSCULAR; INTRAVENOUS ONCE
Status: COMPLETED | OUTPATIENT
Start: 2023-01-17 | End: 2023-01-17

## 2023-01-17 RX ADMIN — TRIAMCINOLONE ACETONIDE 80 MG: 40 INJECTION, SUSPENSION INTRA-ARTICULAR; INTRAMUSCULAR at 09:29

## 2023-01-17 RX ADMIN — KETOROLAC TROMETHAMINE 60 MG: 30 INJECTION, SOLUTION INTRAMUSCULAR; INTRAVENOUS at 09:30

## 2023-01-17 NOTE — PROGRESS NOTES
Everette Mckeon is a 57 y.o. male returns for     Chief Complaint   Patient presents with   • Right Hip - Follow-up, Pain     HISTORY OF PRESENT ILLNESS: Patient presents to office for follow-up of chronic right hip pain due to known moderate to severe osteoarthritis.  Initial onset of pain occurred approximately 2-1/2 to 3 years ago with no known injury or incident.  Patient has been treated conservatively with prescription oral NSAIDs and intramuscular injections of Kenalog and Toradol, which have offered him significant pain improvement for several months at a time in the past.  Patient continues to take Voltaren and is tolerating this medication well with no known adverse effects.  Patient reports gradual return/increase in his right hip pain in recent weeks with no known injury or incident.  Patient has increased pain with longer periods of standing and walking.  Patient localizes his pain to the right anterior hip/groin.  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   Patient has also experienced some intermittent popping sensations in his right hip joint with certain motions. Patient is requesting to proceed with repeat intramuscular injections today of Toradol and Kenalog as these have offered such good pain improvement in the past.no new complaints or concerns noted since last office visit.  No falls or injuries reported since last office visit.  Current pain scale is 7/10.    CONCURRENT MEDICAL HISTORY:    The following portions of the patient's history were reviewed and updated as appropriate: allergies, current medications, past family history, past medical history, past social history, past surgical history and problem list.     ROS  No fevers or chills.  No chest pain or shortness of air.  No GI or   "disturbances. Right hip/groin pain.     PHYSICAL EXAMINATION:       Ht 175.3 cm (69\")   Wt 99.3 kg (219 lb)   BMI 32.34 kg/m²     Physical Exam  Vitals reviewed.   Constitutional:       General: He is not in acute distress.     Appearance: He is well-developed. He is not ill-appearing.   HENT:      Head: Normocephalic.   Pulmonary:      Effort: Pulmonary effort is normal. No respiratory distress.   Abdominal:      General: There is no distension.      Palpations: Abdomen is soft.   Musculoskeletal:         General: No swelling, tenderness, deformity or signs of injury.   Skin:     General: Skin is warm and dry.      Capillary Refill: Capillary refill takes less than 2 seconds.      Findings: No erythema.   Neurological:      Mental Status: He is alert and oriented to person, place, and time.      GCS: GCS eye subscore is 4. GCS verbal subscore is 5. GCS motor subscore is 6.   Psychiatric:         Speech: Speech normal.         Behavior: Behavior normal.         Thought Content: Thought content normal.         Judgment: Judgment normal.         GAIT:     []  Normal  [x]  Antalgic    Assistive device: [x]  None  []  Walker     []  Crutches  []  Cane     []  Wheelchair  []  Stretcher    Right Hip Exam     Tenderness   The patient is experiencing no tenderness.     Range of Motion   Abduction: 30   Flexion: 100   External rotation: 50   Internal rotation: 5     Muscle Strength   The patient has normal right hip strength.    Tests   DEE: positive  Fadir:  Positive FADIR test    Other   Erythema: absent  Sensation: normal  Pulse: present    Comments:  Pain and limitations with range of motion.  No deformity.  No significant leg length discrepancy noted.  No tenderness to palpation.  No swelling appreciated.  No erythema.  No warmth.  No signs of infection noted.        EXAMINATION:  XR HIP W OR WO PELVIS 2-3 VIEW RIGHT     CLINICAL HISTORY:  56 years Male,pain, M25.551 Pain in right hip     COMPARISON:  9/27/2021 " plain films     FINDINGS:  Degenerative arthritis on the right hip with near  complete loss of joint space. There is associated subchondral  sclerosis and cystic change as well as marginal hypertrophic  osteophytes. More mild degenerative arthritis about the left hip  with relative preservation of joint space. No acute fracture. No  dislocation.     IMPRESSION:  Moderate/advanced degenerative arthritis involving  the right hip.     Electronically signed by:  Carlos Manuel Jose MD  5/19/2022 8:46 AM CDT  Workstation: [id number]      ASSESSMENT:    Diagnoses and all orders for this visit:    Right hip pain  -     ketorolac (TORADOL) injection 60 mg  -     triamcinolone acetonide (KENALOG-40) injection 80 mg    Primary osteoarthritis of right hip  -     ketorolac (TORADOL) injection 60 mg  -     triamcinolone acetonide (KENALOG-40) injection 80 mg    PLAN    Patient complains of worsening right hip pain that he localizes to his anterior hip/groin area.  Patient has known moderate to severe osteoarthritic changes in his right hip joint and has experienced similar pain over the past 2-1/2 to 3 years with no known injury or incident.  Subjective complaints and physical exam are consistent with osteoarthritic pain of the right hip joint.  We again discussed continued conservative management options versus eventual surgical consult for hip arthroplasty.  At this time, the patient is not interested in surgical intervention and wants to continue with conservative care.  We again discussed the option of a trial of an intra-articular injection of steroid into the right hip joint for management of chronic pain/inflammation.  However, the patient has always gotten good relief in the past with intramuscular injections of Toradol and Kenalog and he wants to proceed with repeat injections today.  Recommend intramuscular injections of Toradol 60 mg and Kenalog 80 mg today for management of pain/inflammation.  We discussed that if the  injections given today are ineffective for pain relief, then I would recommend a trial of the intra-articular injection of steroid.  We discussed that he can call the office in the next couple of weeks and request this if needed.    Patient has recently been treated for his chronic left-sided low back pain with lumbar radiculopathy affecting the left lower extremity.  Patient's back pain is currently stable and he has established care with a neurosurgeon in [place].  Patient is contemplating possible surgery on his lumbar spine in the near future.    Recommend the following:     -Rest and activity modification as tolerated and based on pain.  -Use of a cane or walker as needed for modified weightbearing off the right hip.   -Gradual progression of weightbearing and activity as pain allows.   -Ice therapy to the affected hip as needed to minimize pain/inflammation.   -Continue with Voltaren XR daily as needed for management of joint pain/inflammation.  Patient can also take Tylenol as needed for additional pain control.    Follow-up as needed for any new, worsening or persistent symptoms.  We again discussed that if the intramuscular injections are ineffective for pain relief, then I would recommend a trial of an intra-articular injection of steroid into his right hip joint as the next step for conservative management of his chronic osteoarthritic hip pain.    Time spent of a minimum of 20 minutes including the face to face evaluation, reviewing of medical history and prior medial records, reviewing of diagnostic studies, documentation, patient education and coordination of care.     EMR Dragon/Transciption Disclaimer: Some of this note may be an electronic transcription/translation of spoken language to printed text using the Dragon Dictation System.     Return if symptoms worsen or fail to improve.        This document has been electronically signed by AGNES Hare on January 18, 2023 19:27 CHRISTIANA Chahal  GARRICK Kelly, APRN

## 2023-01-31 ENCOUNTER — OFFICE VISIT (OUTPATIENT)
Dept: ORTHOPEDIC SURGERY | Facility: CLINIC | Age: 58
End: 2023-01-31
Payer: COMMERCIAL

## 2023-01-31 VITALS — BODY MASS INDEX: 32.58 KG/M2 | WEIGHT: 220 LBS | HEIGHT: 69 IN

## 2023-01-31 DIAGNOSIS — M25.551 RIGHT HIP PAIN: ICD-10-CM

## 2023-01-31 DIAGNOSIS — M16.11 PRIMARY OSTEOARTHRITIS OF RIGHT HIP: Primary | ICD-10-CM

## 2023-01-31 DIAGNOSIS — R26.9 GAIT ABNORMALITY: ICD-10-CM

## 2023-01-31 DIAGNOSIS — Z74.09 LIMITED MOBILITY: ICD-10-CM

## 2023-01-31 PROCEDURE — 99214 OFFICE O/P EST MOD 30 MIN: CPT | Performed by: NURSE PRACTITIONER

## 2023-01-31 RX ORDER — PREDNISONE 10 MG/1
20 TABLET ORAL DAILY
Qty: 15 TABLET | Refills: 0 | Status: SHIPPED | OUTPATIENT
Start: 2023-01-31

## 2023-01-31 RX ORDER — OXYCODONE HYDROCHLORIDE AND ACETAMINOPHEN 5; 325 MG/1; MG/1
1 TABLET ORAL EVERY 4 HOURS PRN
Qty: 18 TABLET | Refills: 0 | Status: SHIPPED | OUTPATIENT
Start: 2023-01-31

## 2023-01-31 NOTE — PROGRESS NOTES
Everette Mckeon is a 57 y.o. male returns for     Chief Complaint   Patient presents with   • Right Hip - Follow-up, Pain     HISTORY OF PRESENT ILLNESS: Patient presents to office for follow-up of chronic right hip pain due to known, advanced osteoarthritis. Initial onset of pain occurred approximately 2-1/2 to 3 years ago with no known injury or incident.  Patient has been treated conservatively with prescription oral NSAIDs and intramuscular injections of Kenalog and Toradol, which have offered him significant pain improvement for several months at a time in the past.  Patient was recently evaluated in office for increased pain on 1/17/2023 and given repeat intramuscular injections of Toradol and Kenalog, which he states only offered relief for a few days and then his pain returned.  Patient states that overall his right hip pain has been progressively worsening and become more severe in recent weeks.  He has not sustained any falls or injuries.  Patient continues to take Voltaren and is tolerating this medication well with no known adverse effects.  Patient continues to localize his pain to the right anterior hip/groin and he has pain that radiates throughout the right femur with longer periods of weightbearing activity.  Patient has difficulty with motion of his hip joint, primarily forward flexion and rotation.  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  Patient states today that he is ready to proceed with planning for right hip replacement as his pain is affecting his daily activities and affecting his quality of life.  No new complaints or concerns noted since last office visit.  Current pain scale is 7/10.      CONCURRENT MEDICAL HISTORY:    The following portions of the patient's history were reviewed and updated as appropriate: allergies, current medications, past family history, past  "medical history, past social history, past surgical history and problem list.     ROS  No fevers or chills.  No chest pain or shortness of air.  No GI or  disturbances.  Right hip pain.    PHYSICAL EXAMINATION:       Ht 175.3 cm (69\")   Wt 99.8 kg (220 lb)   BMI 32.49 kg/m²     Physical Exam  Vitals reviewed.   Constitutional:       General: He is not in acute distress.     Appearance: He is well-developed. He is not ill-appearing.   HENT:      Head: Normocephalic.   Pulmonary:      Effort: Pulmonary effort is normal. No respiratory distress.   Abdominal:      General: There is no distension.      Palpations: Abdomen is soft.   Musculoskeletal:         General: No swelling, tenderness, deformity or signs of injury.   Skin:     General: Skin is warm and dry.      Capillary Refill: Capillary refill takes less than 2 seconds.      Findings: No erythema.   Neurological:      Mental Status: He is alert and oriented to person, place, and time.      GCS: GCS eye subscore is 4. GCS verbal subscore is 5. GCS motor subscore is 6.      Sensory: No sensory deficit.   Psychiatric:         Speech: Speech normal.         Behavior: Behavior normal.         Thought Content: Thought content normal.         Judgment: Judgment normal.         GAIT:     []  Normal  [x]  Antalgic    Assistive device: [x]  None  []  Walker     []  Crutches  []  Cane     []  Wheelchair  []  Stretcher    Right Hip Exam     Tenderness   The patient is experiencing no tenderness.     Range of Motion   Abduction: 30   Flexion: 90   External rotation: 50   Internal rotation: 5     Muscle Strength   The patient has normal right hip strength.    Tests   DEE: positive  Fadir:  Positive FADIR test    Other   Erythema: absent  Sensation: normal  Pulse: present    Comments:  Pain and limitations with range of motion.  No deformity.  No significant leg length discrepancy noted.  No tenderness to palpation.  No swelling appreciated.  No erythema.  No warmth.  No " signs of infection noted.        EXAMINATION:  XR HIP W OR WO PELVIS 2-3 VIEW RIGHT     CLINICAL HISTORY:  56 years Male,pain, M25.551 Pain in right hip     COMPARISON:  9/27/2021 plain films     FINDINGS:  Degenerative arthritis on the right hip with near  complete loss of joint space. There is associated subchondral  sclerosis and cystic change as well as marginal hypertrophic  osteophytes. More mild degenerative arthritis about the left hip  with relative preservation of joint space. No acute fracture. No  dislocation.     IMPRESSION:  Moderate/advanced degenerative arthritis involving  the right hip.     Electronically signed by:  Carlos Manuel Jose MD  5/19/2022 8:46 AM CDT  Workstation: [id number]      ASSESSMENT:    Diagnoses and all orders for this visit:    Primary osteoarthritis of right hip  -     oxyCODONE-acetaminophen (PERCOCET) 5-325 MG per tablet; Take 1 tablet by mouth Every 4 (Four) Hours As Needed for Moderate Pain or Severe Pain.  -     Miscellaneous DME    Right hip pain  -     oxyCODONE-acetaminophen (PERCOCET) 5-325 MG per tablet; Take 1 tablet by mouth Every 4 (Four) Hours As Needed for Moderate Pain or Severe Pain.  -     Miscellaneous DME    Limited mobility  -     Miscellaneous DME    Gait abnormality  -     Miscellaneous DME    Other orders  -     predniSONE (DELTASONE) 10 MG tablet; Take 2 tablets by mouth Daily. X 5 days then 1 tablet daily X 5 days    PLAN    Patient complains of progressively worsening right hip pain over the past few months.  The patient has known moderate to severe osteoarthritic changes in his right hip joint and has experienced similar pain over the past 2-1/2 to 3 years with no initial injury or incident.  The patient has been treated conservatively as described in the HPI above and while he typically would get a few months of relief from intramuscular injections of Toradol and Kenalog, his most recent injections 2 weeks ago only lasted for a few days before the pain  returned.  Subjective complaints and physical exam remain consistent with osteoarthritic pain of the right hip joint.  We again discussed continued conservative management options versus surgical consult.  Patient indicates today that he wants to proceed with surgical consult for right hip replacement as his pain has become severe and is limiting his daily activities and affecting his quality of life.  We again discussed a trial of an intra-articular injection of steroid into the right hip joint for management of his hip pain, which I have offered to order today as we discussed that his hip replacement surgery would likely not be until April or May.  However, the patient has hesitations regarding this as he states that if there is a cancellation and he can have the surgery sooner, he does not want injection to alter that course.  While the patient is willing to try the injection, he also expresses that his hip pain has become severe and that hip arthroplasty will offer more definitive resolution and he does not want to do any procedures that we will delay that option, which is reasonable.  I have highly encouraged the patient to utilize a cane for modified weightbearing off of his right hip.  We discussed proper use of the cane in his right hand.  The patient has a very antalgic gait in office today, which is worse from his prior exams.  He also has chronic lumbar spine issues with intermittent radicular symptoms affecting the left leg and while those are currently stable, we discussed that his antalgic gait to accommodate his right hip may aggravate his low back issues.  A prescription for a cane is faxed to [facility] today.  A tapered 10-day course of prednisone is prescribed today for further management of joint pain/inflammation.    Recommend the following:     -Rest and activity modification as tolerated and based on pain.  -Use of a cane or walker as needed for modified weightbearing off  the right hip.   -Gradual progression of weightbearing and activity as pain allows.   -Ice therapy to the affected hip as needed to minimize pain/inflammation.   -Continue with Voltaren XR daily as needed for management of joint pain/inflammation.  Patient can also take Tylenol as needed for additional pain control.    Percocet 5 mg is prescribed to take as needed for moderate to severe pain that is uncontrolled with nonopioid pain management methods.  Patient has previously tried Norco and states it offered minimal relief.  This patient does not take pain medication chronically and is not opioid dependent. Patient is instructed to take the pain medication sparingly and to take the least amount of pain medication needed to control the pain.  Patient is cautioned that opioids can be addictive.  We discussed other potential adverse side effects of opioids including constipation, dizziness, drowsiness, increased risk for falls and/or respiratory depression.  Patient verbalized understanding of these risks.  I have encouraged the patient to focus on nonopioid pain management methods as much as possible.  DIAMOND is reviewed internally via Epic and is noted to be appropriate.    Follow-up with Dr. Franklin for surgical consult regarding right hip arthroplasty.    Time spent of a minimum of 30 minutes including the face to face evaluation, reviewing of medical history and prior medial records, reviewing of diagnostic studies, prescription drug management, documentation, patient education and coordination of care.     EMR Dragon/Transciption Disclaimer: Some of this note may be an electronic transcription/translation of spoken language to printed text using the Dragon Dictation System.     Return for follow up with Dr. Franklin for surgical consult.        This document has been electronically signed by AGNES Hare on January 31, 2023 10:24 CST      AGNES Hare

## 2023-04-13 ENCOUNTER — OFFICE VISIT (OUTPATIENT)
Dept: ORTHOPEDIC SURGERY | Facility: CLINIC | Age: 58
End: 2023-04-13
Payer: COMMERCIAL

## 2023-04-13 VITALS — HEIGHT: 69 IN | BODY MASS INDEX: 33.03 KG/M2 | WEIGHT: 223 LBS

## 2023-04-13 DIAGNOSIS — R26.9 GAIT ABNORMALITY: ICD-10-CM

## 2023-04-13 DIAGNOSIS — M25.551 RIGHT HIP PAIN: ICD-10-CM

## 2023-04-13 DIAGNOSIS — I10 ESSENTIAL HYPERTENSION: ICD-10-CM

## 2023-04-13 DIAGNOSIS — M16.11 PRIMARY OSTEOARTHRITIS OF RIGHT HIP: Primary | ICD-10-CM

## 2023-04-13 PROCEDURE — 99214 OFFICE O/P EST MOD 30 MIN: CPT | Performed by: ORTHOPAEDIC SURGERY

## 2023-04-13 RX ORDER — PREGABALIN 75 MG/1
75 CAPSULE ORAL ONCE
OUTPATIENT
Start: 2023-04-13 | End: 2023-04-13

## 2023-04-13 RX ORDER — ACETAMINOPHEN 325 MG/1
1000 TABLET ORAL ONCE
OUTPATIENT
Start: 2023-04-13 | End: 2023-04-13

## 2023-04-13 RX ORDER — MELOXICAM 15 MG/1
TABLET ORAL
Qty: 30 TABLET | Refills: 3 | Status: SHIPPED | OUTPATIENT
Start: 2023-04-13

## 2023-04-13 NOTE — H&P (VIEW-ONLY)
Everette Mckeon is a 57 y.o. male   Primary Care Provider Provider, No Known     Chief Complaint   Patient presents with   • Right Hip - Follow-up       HISTORY OF PRESENT ILLNESS:  Everette Mckeon is here for followup of Right hip pain.  The pain has not improved despite long term treatment with multiple conservative management trials.      Prior Arthritis treatments:   [x]  PT   [x]  HEP   [x]  Attempt weight loss    [x]  NSAIDS   [x]  Cane         Pain is chronic dull ache that is severe at times and worse with activity.  Difficulty with ADL's.  Patient is not happy with current quality of life and wants to discuss proceeding with surgical intervention.  Multiple IM injections were ineffective.  Walking with a crutch.     CONCURRENT MEDICAL HISTORY:    Past Medical History:   Diagnosis Date   • Degenerative scoliosis    • Essential hypertension 4/13/2023   • History of stomach ulcers    • Primary osteoarthritis of right hip 3/29/2021   • Sleep apnea        No Known Allergies      Current Outpatient Medications:   •  lisinopril (PRINIVIL,ZESTRIL) 10 MG tablet, , Disp: , Rfl:   •  omeprazole (priLOSEC) 20 MG capsule, Take 1 capsule by mouth Daily., Disp: , Rfl:   •  oxyCODONE-acetaminophen (PERCOCET) 5-325 MG per tablet, Take 1 tablet by mouth Every 4 (Four) Hours As Needed for Moderate Pain or Severe Pain., Disp: 18 tablet, Rfl: 0  •  tiZANidine (ZANAFLEX) 4 MG tablet, Take 1 tablet by mouth Every 8 (Eight) Hours As Needed for Muscle Spasms., Disp: 90 tablet, Rfl: 1  •  meloxicam (MOBIC) 15 MG tablet, 1 PO Daily with food., Disp: 30 tablet, Rfl: 3    Past Surgical History:   Procedure Laterality Date   • GALLBLADDER SURGERY      9-10 years ago   • HAND SURGERY  2000    Partial amputation finger   • KNEE SURGERY  1985   • VASECTOMY      20 years ago       Family History   Problem Relation Age of Onset   • Hypertension Mother    • Cancer Mother    • Hypertension Father    • Hypertension Brother        Social  "History     Socioeconomic History   • Marital status:    Tobacco Use   • Smoking status: Never   • Smokeless tobacco: Never   Substance and Sexual Activity   • Alcohol use: No   • Drug use: No   • Sexual activity: Yes     Partners: Female     Birth control/protection: Surgical, Other     Comment: Vasectomy        Review of Systems   Constitutional: Negative for chills and fever.   Respiratory: Negative.    Cardiovascular: Negative.    Gastrointestinal: Negative.    Musculoskeletal:        Right hip pain   All other systems reviewed and are negative.      PHYSICAL EXAMINATION:       Ht 175.3 cm (69\")   Wt 101 kg (223 lb)   BMI 32.93 kg/m²     Physical Exam  Vitals reviewed.   Constitutional:       General: He is not in acute distress.     Appearance: Normal appearance. He is well-developed.   Cardiovascular:      Rate and Rhythm: Normal rate and regular rhythm.      Heart sounds: Normal heart sounds.   Pulmonary:      Effort: Pulmonary effort is normal.      Breath sounds: Normal breath sounds.   Abdominal:      General: Bowel sounds are normal.      Palpations: Abdomen is soft.   Neurological:      Mental Status: He is alert and oriented to person, place, and time.   Psychiatric:         Behavior: Behavior normal.         Thought Content: Thought content normal.         Judgment: Judgment normal.         GAIT:     []  Normal  [x]  Antalgic    Assistive device: []  None  []  Walker     [x]  Crutches (one) []  Cane     []  Wheelchair []  Stretcher    Right Hip Exam     Range of Motion   Flexion: 110   External rotation: 20   Internal rotation: 0     Muscle Strength   Flexion: 4/5     Tests   DEE: positive  Fadir:  Positive FADIR test    Other   Erythema: absent  Sensation: normal  Pulse: present                  XR Hip With or Without Pelvis 2 - 3 View Right    Result Date: 4/13/2023  Narrative: Ordering Provider:  Rene Franklin MD Ordering Diagnosis/Indication:  Right hip pain, Primary " osteoarthritis of right hip Procedure:  XR HIP W OR WO PELVIS 2-3 VIEW RIGHT Exam Date:  4/13/23 COMPARISON:  Todays X-rays were compared to previous images dated December 6, 2022.     Impression:  AP standing of the pelvis with AP and lateral of the right hip show severe degenerative changes in the right hip with complete loss of joint space and bone-on-bone findings.  There is loss of sphericity of the femoral head and significant cystic changes noted in the femoral head.  Definitive progressive changes noted in comparison to prior x-ray.  The left hip shows minimal arthritic changes with maintenance of joint spacing.  Mild arthritic changes noted in the visible portions of the lower lumbar spine.  No acute findings. Rene Franklin MD 4/13/23       PRIOR XRAYS FOR REVIEW:   XR Hip With or Without Pelvis 2 - 3 View Right  Order date: 5/18/2022  Authorizing: Fela Kelly APRN  Ordered by Fela Kelly APRN on 5/18/2022.     Narrative & Impression    EXAMINATION:  XR HIP W OR WO PELVIS 2-3 VIEW RIGHT     CLINICAL HISTORY:  56 years Male,pain, M25.551 Pain in right hip     COMPARISON:  9/27/2021 plain films     FINDINGS:  Degenerative arthritis on the right hip with near  complete loss of joint space. There is associated subchondral  sclerosis and cystic change as well as marginal hypertrophic  osteophytes. More mild degenerative arthritis about the left hip  with relative preservation of joint space. No acute fracture. No  dislocation.     IMPRESSION:  Moderate/advanced degenerative arthritis involving  the right hip.     Electronically signed by:  Carlos Manuel Jose MD  5/19/2022 8:46 AM CDT  Workstation: [id number]             ASSESSMENT:    Diagnoses and all orders for this visit:    Primary osteoarthritis of right hip  -     XR Hip With or Without Pelvis 2 - 3 View Right  -     Case Request; Standing  -     Type and screen; Future  -     Tranexamic Acid 1,000 mg in sodium chloride 0.9 % 100 mL  -      Tranexamic Acid 1,000 mg in sodium chloride 0.9 % 100 mL  -     Bupivacaine-Meloxicam -6 MG/7ML solution 14 mL  -     ethyl alcohol 62 % 2 each  -     ceFAZolin (ANCEF) 2 g in sodium chloride 0.9 % 100 mL IVPB  -     acetaminophen (TYLENOL) tablet 975 mg  -     pregabalin (LYRICA) capsule 75 mg  -     Case Request    Right hip pain  -     XR Hip With or Without Pelvis 2 - 3 View Right  -     Case Request; Standing  -     Type and screen; Future  -     Tranexamic Acid 1,000 mg in sodium chloride 0.9 % 100 mL  -     Tranexamic Acid 1,000 mg in sodium chloride 0.9 % 100 mL  -     Bupivacaine-Meloxicam -6 MG/7ML solution 14 mL  -     ethyl alcohol 62 % 2 each  -     ceFAZolin (ANCEF) 2 g in sodium chloride 0.9 % 100 mL IVPB  -     acetaminophen (TYLENOL) tablet 975 mg  -     pregabalin (LYRICA) capsule 75 mg  -     Case Request    Essential hypertension  -     Case Request; Standing  -     Type and screen; Future  -     Tranexamic Acid 1,000 mg in sodium chloride 0.9 % 100 mL  -     Tranexamic Acid 1,000 mg in sodium chloride 0.9 % 100 mL  -     Bupivacaine-Meloxicam -6 MG/7ML solution 14 mL  -     ethyl alcohol 62 % 2 each  -     ceFAZolin (ANCEF) 2 g in sodium chloride 0.9 % 100 mL IVPB  -     acetaminophen (TYLENOL) tablet 975 mg  -     pregabalin (LYRICA) capsule 75 mg  -     Case Request    Gait abnormality  -     Case Request; Standing  -     Type and screen; Future  -     Tranexamic Acid 1,000 mg in sodium chloride 0.9 % 100 mL  -     Tranexamic Acid 1,000 mg in sodium chloride 0.9 % 100 mL  -     Bupivacaine-Meloxicam -6 MG/7ML solution 14 mL  -     ethyl alcohol 62 % 2 each  -     ceFAZolin (ANCEF) 2 g in sodium chloride 0.9 % 100 mL IVPB  -     acetaminophen (TYLENOL) tablet 975 mg  -     pregabalin (LYRICA) capsule 75 mg  -     Case Request    Other orders  -     meloxicam (MOBIC) 15 MG tablet; 1 PO Daily with food.  -     Follow Anesthesia Guidelines / Protocol; Future  -     Provide  instructions to patient regarding NPO status; Future  -     Chlorhexidine Skin Prep - Educate and Review With Patient; Future  -     Provide Patient With ERAS Hydration Instructions; Future  -     Provide Patient With Enhanced Recovery Booklet(s) or Handout; Future  -     Perform A Memory Screening On All Hip/Knee Replacement Patients >Or Equal To 65 Years Or Older; Future  -     Complete A PROMIS And HOOS Or KOOS Survey If Having Hip Or Knee Replacement; Future  -     Follow Anesthesia Guidelines / Protocol; Standing  -     Verify NPO Status; Standing  -     POC Glucose Once; Standing  -     Clip operative site; Standing  -     Obtain informed consent (if not collected inpatient or PAT); Standing  -     Radiology Technician to Be Present for Intra-Op C-Arm Use; Standing  -     Provide NPO Instructions to Patient          PLAN    The patient voiced understanding of the risks, benefits, and alternative forms of treatment that were discussed and the patient consents to proceed with surgery.  All risks, benefits and alternatives were discussed.  Risks include, but not exclusive to anesthetic complications, including death, MI, CVA, infection, bleeding DVT, fracture, residual pain and need for future surgery.    This discussion was held with the patient by Rene Franklin MD and all questions were answered.    Plan right total hip arthroplasty    Possible same day discharge.    Continue meloxicam including the day of surgery.    Return for Post-operative eval.    Rene Franklin MD

## 2023-04-13 NOTE — PROGRESS NOTES
Everette Mckeon is a 57 y.o. male   Primary Care Provider Provider, No Known     Chief Complaint   Patient presents with   • Right Hip - Follow-up       HISTORY OF PRESENT ILLNESS:  Everette Mckeon is here for followup of Right hip pain.  The pain has not improved despite long term treatment with multiple conservative management trials.      Prior Arthritis treatments:   [x]  PT   [x]  HEP   [x]  Attempt weight loss    [x]  NSAIDS   [x]  Cane         Pain is chronic dull ache that is severe at times and worse with activity.  Difficulty with ADL's.  Patient is not happy with current quality of life and wants to discuss proceeding with surgical intervention.  Multiple IM injections were ineffective.  Walking with a crutch.     CONCURRENT MEDICAL HISTORY:    Past Medical History:   Diagnosis Date   • Degenerative scoliosis    • Essential hypertension 4/13/2023   • History of stomach ulcers    • Primary osteoarthritis of right hip 3/29/2021   • Sleep apnea        No Known Allergies      Current Outpatient Medications:   •  lisinopril (PRINIVIL,ZESTRIL) 10 MG tablet, , Disp: , Rfl:   •  omeprazole (priLOSEC) 20 MG capsule, Take 1 capsule by mouth Daily., Disp: , Rfl:   •  oxyCODONE-acetaminophen (PERCOCET) 5-325 MG per tablet, Take 1 tablet by mouth Every 4 (Four) Hours As Needed for Moderate Pain or Severe Pain., Disp: 18 tablet, Rfl: 0  •  tiZANidine (ZANAFLEX) 4 MG tablet, Take 1 tablet by mouth Every 8 (Eight) Hours As Needed for Muscle Spasms., Disp: 90 tablet, Rfl: 1  •  meloxicam (MOBIC) 15 MG tablet, 1 PO Daily with food., Disp: 30 tablet, Rfl: 3    Past Surgical History:   Procedure Laterality Date   • GALLBLADDER SURGERY      9-10 years ago   • HAND SURGERY  2000    Partial amputation finger   • KNEE SURGERY  1985   • VASECTOMY      20 years ago       Family History   Problem Relation Age of Onset   • Hypertension Mother    • Cancer Mother    • Hypertension Father    • Hypertension Brother        Social  "History     Socioeconomic History   • Marital status:    Tobacco Use   • Smoking status: Never   • Smokeless tobacco: Never   Substance and Sexual Activity   • Alcohol use: No   • Drug use: No   • Sexual activity: Yes     Partners: Female     Birth control/protection: Surgical, Other     Comment: Vasectomy        Review of Systems   Constitutional: Negative for chills and fever.   Respiratory: Negative.    Cardiovascular: Negative.    Gastrointestinal: Negative.    Musculoskeletal:        Right hip pain   All other systems reviewed and are negative.      PHYSICAL EXAMINATION:       Ht 175.3 cm (69\")   Wt 101 kg (223 lb)   BMI 32.93 kg/m²     Physical Exam  Vitals reviewed.   Constitutional:       General: He is not in acute distress.     Appearance: Normal appearance. He is well-developed.   Cardiovascular:      Rate and Rhythm: Normal rate and regular rhythm.      Heart sounds: Normal heart sounds.   Pulmonary:      Effort: Pulmonary effort is normal.      Breath sounds: Normal breath sounds.   Abdominal:      General: Bowel sounds are normal.      Palpations: Abdomen is soft.   Neurological:      Mental Status: He is alert and oriented to person, place, and time.   Psychiatric:         Behavior: Behavior normal.         Thought Content: Thought content normal.         Judgment: Judgment normal.         GAIT:     []  Normal  [x]  Antalgic    Assistive device: []  None  []  Walker     [x]  Crutches (one) []  Cane     []  Wheelchair []  Stretcher    Right Hip Exam     Range of Motion   Flexion: 110   External rotation: 20   Internal rotation: 0     Muscle Strength   Flexion: 4/5     Tests   DEE: positive  Fadir:  Positive FADIR test    Other   Erythema: absent  Sensation: normal  Pulse: present                  XR Hip With or Without Pelvis 2 - 3 View Right    Result Date: 4/13/2023  Narrative: Ordering Provider:  Rene Franklin MD Ordering Diagnosis/Indication:  Right hip pain, Primary " osteoarthritis of right hip Procedure:  XR HIP W OR WO PELVIS 2-3 VIEW RIGHT Exam Date:  4/13/23 COMPARISON:  Todays X-rays were compared to previous images dated December 6, 2022.     Impression:  AP standing of the pelvis with AP and lateral of the right hip show severe degenerative changes in the right hip with complete loss of joint space and bone-on-bone findings.  There is loss of sphericity of the femoral head and significant cystic changes noted in the femoral head.  Definitive progressive changes noted in comparison to prior x-ray.  The left hip shows minimal arthritic changes with maintenance of joint spacing.  Mild arthritic changes noted in the visible portions of the lower lumbar spine.  No acute findings. Rene Franklin MD 4/13/23       PRIOR XRAYS FOR REVIEW:   XR Hip With or Without Pelvis 2 - 3 View Right  Order date: 5/18/2022  Authorizing: Fela Kelly APRN  Ordered by Fela Kelly APRN on 5/18/2022.     Narrative & Impression    EXAMINATION:  XR HIP W OR WO PELVIS 2-3 VIEW RIGHT     CLINICAL HISTORY:  56 years Male,pain, M25.551 Pain in right hip     COMPARISON:  9/27/2021 plain films     FINDINGS:  Degenerative arthritis on the right hip with near  complete loss of joint space. There is associated subchondral  sclerosis and cystic change as well as marginal hypertrophic  osteophytes. More mild degenerative arthritis about the left hip  with relative preservation of joint space. No acute fracture. No  dislocation.     IMPRESSION:  Moderate/advanced degenerative arthritis involving  the right hip.     Electronically signed by:  Carlos Manuel Jose MD  5/19/2022 8:46 AM CDT  Workstation: [id number]             ASSESSMENT:    Diagnoses and all orders for this visit:    Primary osteoarthritis of right hip  -     XR Hip With or Without Pelvis 2 - 3 View Right  -     Case Request; Standing  -     Type and screen; Future  -     Tranexamic Acid 1,000 mg in sodium chloride 0.9 % 100 mL  -      Tranexamic Acid 1,000 mg in sodium chloride 0.9 % 100 mL  -     Bupivacaine-Meloxicam -6 MG/7ML solution 14 mL  -     ethyl alcohol 62 % 2 each  -     ceFAZolin (ANCEF) 2 g in sodium chloride 0.9 % 100 mL IVPB  -     acetaminophen (TYLENOL) tablet 975 mg  -     pregabalin (LYRICA) capsule 75 mg  -     Case Request    Right hip pain  -     XR Hip With or Without Pelvis 2 - 3 View Right  -     Case Request; Standing  -     Type and screen; Future  -     Tranexamic Acid 1,000 mg in sodium chloride 0.9 % 100 mL  -     Tranexamic Acid 1,000 mg in sodium chloride 0.9 % 100 mL  -     Bupivacaine-Meloxicam -6 MG/7ML solution 14 mL  -     ethyl alcohol 62 % 2 each  -     ceFAZolin (ANCEF) 2 g in sodium chloride 0.9 % 100 mL IVPB  -     acetaminophen (TYLENOL) tablet 975 mg  -     pregabalin (LYRICA) capsule 75 mg  -     Case Request    Essential hypertension  -     Case Request; Standing  -     Type and screen; Future  -     Tranexamic Acid 1,000 mg in sodium chloride 0.9 % 100 mL  -     Tranexamic Acid 1,000 mg in sodium chloride 0.9 % 100 mL  -     Bupivacaine-Meloxicam -6 MG/7ML solution 14 mL  -     ethyl alcohol 62 % 2 each  -     ceFAZolin (ANCEF) 2 g in sodium chloride 0.9 % 100 mL IVPB  -     acetaminophen (TYLENOL) tablet 975 mg  -     pregabalin (LYRICA) capsule 75 mg  -     Case Request    Gait abnormality  -     Case Request; Standing  -     Type and screen; Future  -     Tranexamic Acid 1,000 mg in sodium chloride 0.9 % 100 mL  -     Tranexamic Acid 1,000 mg in sodium chloride 0.9 % 100 mL  -     Bupivacaine-Meloxicam -6 MG/7ML solution 14 mL  -     ethyl alcohol 62 % 2 each  -     ceFAZolin (ANCEF) 2 g in sodium chloride 0.9 % 100 mL IVPB  -     acetaminophen (TYLENOL) tablet 975 mg  -     pregabalin (LYRICA) capsule 75 mg  -     Case Request    Other orders  -     meloxicam (MOBIC) 15 MG tablet; 1 PO Daily with food.  -     Follow Anesthesia Guidelines / Protocol; Future  -     Provide  instructions to patient regarding NPO status; Future  -     Chlorhexidine Skin Prep - Educate and Review With Patient; Future  -     Provide Patient With ERAS Hydration Instructions; Future  -     Provide Patient With Enhanced Recovery Booklet(s) or Handout; Future  -     Perform A Memory Screening On All Hip/Knee Replacement Patients >Or Equal To 65 Years Or Older; Future  -     Complete A PROMIS And HOOS Or KOOS Survey If Having Hip Or Knee Replacement; Future  -     Follow Anesthesia Guidelines / Protocol; Standing  -     Verify NPO Status; Standing  -     POC Glucose Once; Standing  -     Clip operative site; Standing  -     Obtain informed consent (if not collected inpatient or PAT); Standing  -     Radiology Technician to Be Present for Intra-Op C-Arm Use; Standing  -     Provide NPO Instructions to Patient          PLAN    The patient voiced understanding of the risks, benefits, and alternative forms of treatment that were discussed and the patient consents to proceed with surgery.  All risks, benefits and alternatives were discussed.  Risks include, but not exclusive to anesthetic complications, including death, MI, CVA, infection, bleeding DVT, fracture, residual pain and need for future surgery.    This discussion was held with the patient by Rene Franklin MD and all questions were answered.    Plan right total hip arthroplasty    Possible same day discharge.    Continue meloxicam including the day of surgery.    Return for Post-operative eval.    Rene Franklin MD

## 2023-05-01 ENCOUNTER — PRE-ADMISSION TESTING (OUTPATIENT)
Dept: PREADMISSION TESTING | Facility: HOSPITAL | Age: 58
End: 2023-05-01
Payer: COMMERCIAL

## 2023-05-01 VITALS
HEIGHT: 69 IN | RESPIRATION RATE: 18 BRPM | SYSTOLIC BLOOD PRESSURE: 150 MMHG | BODY MASS INDEX: 33.62 KG/M2 | OXYGEN SATURATION: 93 % | WEIGHT: 227 LBS | HEART RATE: 75 BPM | DIASTOLIC BLOOD PRESSURE: 90 MMHG

## 2023-05-01 DIAGNOSIS — I10 ESSENTIAL HYPERTENSION: ICD-10-CM

## 2023-05-01 DIAGNOSIS — M16.11 PRIMARY OSTEOARTHRITIS OF RIGHT HIP: ICD-10-CM

## 2023-05-01 DIAGNOSIS — M25.551 RIGHT HIP PAIN: ICD-10-CM

## 2023-05-01 DIAGNOSIS — R26.9 GAIT ABNORMALITY: ICD-10-CM

## 2023-05-01 LAB
ABO GROUP BLD: NORMAL
BLD GP AB SCN SERPL QL: NEGATIVE
DEPRECATED RDW RBC AUTO: 39.8 FL (ref 37–54)
ERYTHROCYTE [DISTWIDTH] IN BLOOD BY AUTOMATED COUNT: 12.1 % (ref 12.3–15.4)
HCT VFR BLD AUTO: 42.6 % (ref 37.5–51)
HGB BLD-MCNC: 14.3 G/DL (ref 13–17.7)
HOLD SPECIMEN: NORMAL
Lab: NORMAL
MCH RBC QN AUTO: 30.1 PG (ref 26.6–33)
MCHC RBC AUTO-ENTMCNC: 33.6 G/DL (ref 31.5–35.7)
MCV RBC AUTO: 89.7 FL (ref 79–97)
PLATELET # BLD AUTO: 236 10*3/MM3 (ref 140–450)
PMV BLD AUTO: 11.6 FL (ref 6–12)
QT INTERVAL: 372 MS
QTC INTERVAL: 415 MS
RBC # BLD AUTO: 4.75 10*6/MM3 (ref 4.14–5.8)
RH BLD: NEGATIVE
T&S EXPIRATION DATE: NORMAL
WBC NRBC COR # BLD: 9.54 10*3/MM3 (ref 3.4–10.8)

## 2023-05-01 PROCEDURE — 85027 COMPLETE CBC AUTOMATED: CPT

## 2023-05-01 PROCEDURE — 93005 ELECTROCARDIOGRAM TRACING: CPT

## 2023-05-01 PROCEDURE — 86900 BLOOD TYPING SEROLOGIC ABO: CPT

## 2023-05-01 PROCEDURE — 86901 BLOOD TYPING SEROLOGIC RH(D): CPT

## 2023-05-01 PROCEDURE — 36415 COLL VENOUS BLD VENIPUNCTURE: CPT

## 2023-05-01 PROCEDURE — 86850 RBC ANTIBODY SCREEN: CPT

## 2023-05-01 RX ORDER — SODIUM CHLORIDE, SODIUM GLUCONATE, SODIUM ACETATE, POTASSIUM CHLORIDE AND MAGNESIUM CHLORIDE 526; 502; 368; 37; 30 MG/100ML; MG/100ML; MG/100ML; MG/100ML; MG/100ML
1000 INJECTION, SOLUTION INTRAVENOUS CONTINUOUS PRN
OUTPATIENT
Start: 2023-05-01

## 2023-05-01 RX ORDER — MELOXICAM 15 MG/1
15 TABLET ORAL NIGHTLY
COMMUNITY

## 2023-05-01 NOTE — PAT
Chlorhexidine scrub given with instruction sheet.   Instruction reviewed in PAT, understanding verbalized.    ERAS booklet given.  Hydration instruction reviewed in PAT.    Pt states he attended joint class.

## 2023-05-09 ENCOUNTER — ANESTHESIA EVENT (OUTPATIENT)
Dept: PERIOP | Facility: HOSPITAL | Age: 58
End: 2023-05-09
Payer: COMMERCIAL

## 2023-05-10 ENCOUNTER — APPOINTMENT (OUTPATIENT)
Dept: GENERAL RADIOLOGY | Facility: HOSPITAL | Age: 58
End: 2023-05-10
Payer: COMMERCIAL

## 2023-05-10 ENCOUNTER — HOSPITAL ENCOUNTER (OUTPATIENT)
Facility: HOSPITAL | Age: 58
Setting detail: HOSPITAL OUTPATIENT SURGERY
Discharge: HOME | End: 2023-05-10
Attending: ORTHOPAEDIC SURGERY | Admitting: ORTHOPAEDIC SURGERY
Payer: COMMERCIAL

## 2023-05-10 ENCOUNTER — ANESTHESIA (OUTPATIENT)
Dept: PERIOP | Facility: HOSPITAL | Age: 58
End: 2023-05-10
Payer: COMMERCIAL

## 2023-05-10 VITALS
WEIGHT: 225.75 LBS | DIASTOLIC BLOOD PRESSURE: 82 MMHG | HEART RATE: 100 BPM | OXYGEN SATURATION: 95 % | SYSTOLIC BLOOD PRESSURE: 125 MMHG | BODY MASS INDEX: 33.44 KG/M2 | RESPIRATION RATE: 18 BRPM | HEIGHT: 69 IN | TEMPERATURE: 97.2 F

## 2023-05-10 DIAGNOSIS — M25.551 RIGHT HIP PAIN: ICD-10-CM

## 2023-05-10 DIAGNOSIS — M16.11 PRIMARY OSTEOARTHRITIS OF RIGHT HIP: ICD-10-CM

## 2023-05-10 DIAGNOSIS — Z78.9 IMPAIRED MOBILITY AND ADLS: ICD-10-CM

## 2023-05-10 DIAGNOSIS — Z74.09 IMPAIRED FUNCTIONAL MOBILITY, BALANCE, GAIT, AND ENDURANCE: ICD-10-CM

## 2023-05-10 DIAGNOSIS — Z74.09 IMPAIRED MOBILITY AND ADLS: ICD-10-CM

## 2023-05-10 DIAGNOSIS — R26.9 GAIT ABNORMALITY: ICD-10-CM

## 2023-05-10 DIAGNOSIS — I10 ESSENTIAL HYPERTENSION: ICD-10-CM

## 2023-05-10 LAB
ABO GROUP BLD: NORMAL
BLD GP AB SCN SERPL QL: NEGATIVE
HCT VFR BLD AUTO: 42.2 % (ref 37.5–51)
HGB BLD-MCNC: 13.8 G/DL (ref 13–17.7)
Lab: NORMAL
RH BLD: NEGATIVE
T&S EXPIRATION DATE: NORMAL

## 2023-05-10 PROCEDURE — 25010000002 ONDANSETRON PER 1 MG: Performed by: NURSE ANESTHETIST, CERTIFIED REGISTERED

## 2023-05-10 PROCEDURE — 25010000002 DEXAMETHASONE PER 1 MG: Performed by: NURSE ANESTHETIST, CERTIFIED REGISTERED

## 2023-05-10 PROCEDURE — 76000 FLUOROSCOPY <1 HR PHYS/QHP: CPT

## 2023-05-10 PROCEDURE — 25010000002 FENTANYL CITRATE (PF) 100 MCG/2ML SOLUTION: Performed by: NURSE ANESTHETIST, CERTIFIED REGISTERED

## 2023-05-10 PROCEDURE — 86850 RBC ANTIBODY SCREEN: CPT | Performed by: ANESTHESIOLOGY

## 2023-05-10 PROCEDURE — 27130 TOTAL HIP ARTHROPLASTY: CPT | Performed by: ORTHOPAEDIC SURGERY

## 2023-05-10 PROCEDURE — 25010000002 PROPOFOL 10 MG/ML EMULSION: Performed by: NURSE ANESTHETIST, CERTIFIED REGISTERED

## 2023-05-10 PROCEDURE — 25010000002 PROPOFOL 1000 MG/100ML EMULSION: Performed by: NURSE ANESTHETIST, CERTIFIED REGISTERED

## 2023-05-10 PROCEDURE — 86901 BLOOD TYPING SEROLOGIC RH(D): CPT | Performed by: ANESTHESIOLOGY

## 2023-05-10 PROCEDURE — C9088 BUPIVACAINE-MELOXICAM ER 200-6 MG/7ML SOLUTION: HCPCS | Performed by: ORTHOPAEDIC SURGERY

## 2023-05-10 PROCEDURE — 86900 BLOOD TYPING SEROLOGIC ABO: CPT | Performed by: ANESTHESIOLOGY

## 2023-05-10 PROCEDURE — 25010000002 BUPIVACAINE-MELOXICAM ER 200-6 MG/7ML SOLUTION: Performed by: ORTHOPAEDIC SURGERY

## 2023-05-10 PROCEDURE — 85014 HEMATOCRIT: CPT | Performed by: ORTHOPAEDIC SURGERY

## 2023-05-10 PROCEDURE — 85018 HEMOGLOBIN: CPT | Performed by: ORTHOPAEDIC SURGERY

## 2023-05-10 PROCEDURE — 27130 TOTAL HIP ARTHROPLASTY: CPT | Performed by: SPECIALIST/TECHNOLOGIST, OTHER

## 2023-05-10 PROCEDURE — 97116 GAIT TRAINING THERAPY: CPT

## 2023-05-10 PROCEDURE — 97166 OT EVAL MOD COMPLEX 45 MIN: CPT

## 2023-05-10 PROCEDURE — 25010000002 MIDAZOLAM PER 1 MG: Performed by: NURSE ANESTHETIST, CERTIFIED REGISTERED

## 2023-05-10 PROCEDURE — 97162 PT EVAL MOD COMPLEX 30 MIN: CPT

## 2023-05-10 PROCEDURE — 25010000002 CEFAZOLIN PER 500 MG: Performed by: ORTHOPAEDIC SURGERY

## 2023-05-10 PROCEDURE — 25010000002 ROPIVACAINE PER 1 MG: Performed by: NURSE ANESTHETIST, CERTIFIED REGISTERED

## 2023-05-10 RX ORDER — LIDOCAINE HYDROCHLORIDE 10 MG/ML
INJECTION, SOLUTION EPIDURAL; INFILTRATION; INTRACAUDAL; PERINEURAL
Status: DISCONTINUED | OUTPATIENT
Start: 2023-05-10 | End: 2023-05-10 | Stop reason: SURG

## 2023-05-10 RX ORDER — PROPOFOL 10 MG/ML
INJECTION, EMULSION INTRAVENOUS CONTINUOUS PRN
Status: DISCONTINUED | OUTPATIENT
Start: 2023-05-10 | End: 2023-05-10 | Stop reason: SURG

## 2023-05-10 RX ORDER — TIZANIDINE 4 MG/1
4 TABLET ORAL EVERY 8 HOURS PRN
Status: DISCONTINUED | OUTPATIENT
Start: 2023-05-10 | End: 2023-05-10 | Stop reason: HOSPADM

## 2023-05-10 RX ORDER — LISINOPRIL 10 MG/1
10 TABLET ORAL NIGHTLY
Status: DISCONTINUED | OUTPATIENT
Start: 2023-05-10 | End: 2023-05-10 | Stop reason: HOSPADM

## 2023-05-10 RX ORDER — NALOXONE HCL 0.4 MG/ML
0.1 VIAL (ML) INJECTION
Status: DISCONTINUED | OUTPATIENT
Start: 2023-05-10 | End: 2023-05-10 | Stop reason: HOSPADM

## 2023-05-10 RX ORDER — FAMOTIDINE 40 MG/1
40 TABLET, FILM COATED ORAL DAILY
Status: DISCONTINUED | OUTPATIENT
Start: 2023-05-10 | End: 2023-05-10 | Stop reason: HOSPADM

## 2023-05-10 RX ORDER — ROPIVACAINE HYDROCHLORIDE 5 MG/ML
INJECTION, SOLUTION EPIDURAL; INFILTRATION; PERINEURAL
Status: COMPLETED | OUTPATIENT
Start: 2023-05-10 | End: 2023-05-10

## 2023-05-10 RX ORDER — BUPIVACAINE HCL/0.9 % NACL/PF 0.1 %
2 PLASTIC BAG, INJECTION (ML) EPIDURAL EVERY 8 HOURS
Status: DISCONTINUED | OUTPATIENT
Start: 2023-05-10 | End: 2023-05-10 | Stop reason: HOSPADM

## 2023-05-10 RX ORDER — AMOXICILLIN 250 MG
2 CAPSULE ORAL NIGHTLY
Qty: 40 TABLET | Refills: 0 | Status: SHIPPED | OUTPATIENT
Start: 2023-05-10 | End: 2023-05-30

## 2023-05-10 RX ORDER — OXYCODONE HYDROCHLORIDE 5 MG/1
5 TABLET ORAL EVERY 4 HOURS PRN
Status: DISCONTINUED | OUTPATIENT
Start: 2023-05-10 | End: 2023-05-10 | Stop reason: HOSPADM

## 2023-05-10 RX ORDER — SODIUM CHLORIDE, SODIUM GLUCONATE, SODIUM ACETATE, POTASSIUM CHLORIDE AND MAGNESIUM CHLORIDE 526; 502; 368; 37; 30 MG/100ML; MG/100ML; MG/100ML; MG/100ML; MG/100ML
1000 INJECTION, SOLUTION INTRAVENOUS CONTINUOUS PRN
Status: DISCONTINUED | OUTPATIENT
Start: 2023-05-10 | End: 2023-05-10 | Stop reason: HOSPADM

## 2023-05-10 RX ORDER — FENTANYL CITRATE 50 UG/ML
INJECTION, SOLUTION INTRAMUSCULAR; INTRAVENOUS AS NEEDED
Status: DISCONTINUED | OUTPATIENT
Start: 2023-05-10 | End: 2023-05-10 | Stop reason: SURG

## 2023-05-10 RX ORDER — ONDANSETRON 4 MG/1
4 TABLET, FILM COATED ORAL EVERY 6 HOURS PRN
Status: DISCONTINUED | OUTPATIENT
Start: 2023-05-10 | End: 2023-05-10 | Stop reason: HOSPADM

## 2023-05-10 RX ORDER — ROPIVACAINE HYDROCHLORIDE 5 MG/ML
INJECTION, SOLUTION EPIDURAL; INFILTRATION; PERINEURAL
Status: DISCONTINUED | OUTPATIENT
Start: 2023-05-10 | End: 2023-05-10 | Stop reason: SURG

## 2023-05-10 RX ORDER — ONDANSETRON 2 MG/ML
4 INJECTION INTRAMUSCULAR; INTRAVENOUS EVERY 6 HOURS PRN
Status: DISCONTINUED | OUTPATIENT
Start: 2023-05-10 | End: 2023-05-10 | Stop reason: HOSPADM

## 2023-05-10 RX ORDER — ACETAMINOPHEN 500 MG
1000 TABLET ORAL 4 TIMES DAILY
Status: DISCONTINUED | OUTPATIENT
Start: 2023-05-10 | End: 2023-05-10 | Stop reason: HOSPADM

## 2023-05-10 RX ORDER — PREGABALIN 75 MG/1
75 CAPSULE ORAL ONCE
Status: COMPLETED | OUTPATIENT
Start: 2023-05-10 | End: 2023-05-10

## 2023-05-10 RX ORDER — SODIUM CHLORIDE 0.9 % (FLUSH) 0.9 %
3-10 SYRINGE (ML) INJECTION AS NEEDED
Status: DISCONTINUED | OUTPATIENT
Start: 2023-05-10 | End: 2023-05-10 | Stop reason: HOSPADM

## 2023-05-10 RX ORDER — ASPIRIN 81 MG/1
81 TABLET ORAL 2 TIMES DAILY
Qty: 60 TABLET | Refills: 0 | Status: SHIPPED | OUTPATIENT
Start: 2023-05-26 | End: 2023-06-25

## 2023-05-10 RX ORDER — FERROUS SULFATE TAB EC 324 MG (65 MG FE EQUIVALENT) 324 (65 FE) MG
324 TABLET DELAYED RESPONSE ORAL
Status: DISCONTINUED | OUTPATIENT
Start: 2023-05-10 | End: 2023-05-10 | Stop reason: HOSPADM

## 2023-05-10 RX ORDER — MIDAZOLAM HYDROCHLORIDE 1 MG/ML
INJECTION INTRAMUSCULAR; INTRAVENOUS AS NEEDED
Status: DISCONTINUED | OUTPATIENT
Start: 2023-05-10 | End: 2023-05-10 | Stop reason: SURG

## 2023-05-10 RX ORDER — SODIUM CHLORIDE 9 MG/ML
100 INJECTION, SOLUTION INTRAVENOUS CONTINUOUS
Status: DISCONTINUED | OUTPATIENT
Start: 2023-05-10 | End: 2023-05-10 | Stop reason: HOSPADM

## 2023-05-10 RX ORDER — BUPIVACAINE HCL/0.9 % NACL/PF 0.1 %
2 PLASTIC BAG, INJECTION (ML) EPIDURAL ONCE
Status: COMPLETED | OUTPATIENT
Start: 2023-05-10 | End: 2023-05-10

## 2023-05-10 RX ORDER — FERROUS SULFATE TAB EC 324 MG (65 MG FE EQUIVALENT) 324 (65 FE) MG
324 TABLET DELAYED RESPONSE ORAL
Qty: 30 TABLET | Refills: 0 | Status: SHIPPED | OUTPATIENT
Start: 2023-05-10 | End: 2023-06-09

## 2023-05-10 RX ORDER — SODIUM CHLORIDE 9 MG/ML
40 INJECTION, SOLUTION INTRAVENOUS AS NEEDED
Status: DISCONTINUED | OUTPATIENT
Start: 2023-05-10 | End: 2023-05-10 | Stop reason: HOSPADM

## 2023-05-10 RX ORDER — DEXAMETHASONE SODIUM PHOSPHATE 4 MG/ML
INJECTION, SOLUTION INTRA-ARTICULAR; INTRALESIONAL; INTRAMUSCULAR; INTRAVENOUS; SOFT TISSUE AS NEEDED
Status: DISCONTINUED | OUTPATIENT
Start: 2023-05-10 | End: 2023-05-10 | Stop reason: SURG

## 2023-05-10 RX ORDER — ONDANSETRON 2 MG/ML
4 INJECTION INTRAMUSCULAR; INTRAVENOUS ONCE AS NEEDED
Status: DISCONTINUED | OUTPATIENT
Start: 2023-05-10 | End: 2023-05-10

## 2023-05-10 RX ORDER — ONDANSETRON 2 MG/ML
INJECTION INTRAMUSCULAR; INTRAVENOUS AS NEEDED
Status: DISCONTINUED | OUTPATIENT
Start: 2023-05-10 | End: 2023-05-10 | Stop reason: SURG

## 2023-05-10 RX ORDER — SODIUM CHLORIDE 0.9 % (FLUSH) 0.9 %
3 SYRINGE (ML) INJECTION EVERY 12 HOURS SCHEDULED
Status: DISCONTINUED | OUTPATIENT
Start: 2023-05-10 | End: 2023-05-10 | Stop reason: HOSPADM

## 2023-05-10 RX ORDER — OXYCODONE AND ACETAMINOPHEN 7.5; 325 MG/1; MG/1
1 TABLET ORAL EVERY 6 HOURS PRN
Qty: 30 TABLET | Refills: 0 | Status: SHIPPED | OUTPATIENT
Start: 2023-05-10 | End: 2023-05-17 | Stop reason: SDUPTHER

## 2023-05-10 RX ORDER — ASPIRIN 81 MG/1
81 TABLET ORAL 2 TIMES DAILY
Status: DISCONTINUED | OUTPATIENT
Start: 2023-05-26 | End: 2023-05-10 | Stop reason: HOSPADM

## 2023-05-10 RX ORDER — BUPIVACAINE HYDROCHLORIDE 7.5 MG/ML
INJECTION, SOLUTION EPIDURAL; RETROBULBAR
Status: COMPLETED | OUTPATIENT
Start: 2023-05-10 | End: 2023-05-10

## 2023-05-10 RX ORDER — ACETAMINOPHEN 325 MG/1
1000 TABLET ORAL ONCE
Status: COMPLETED | OUTPATIENT
Start: 2023-05-10 | End: 2023-05-10

## 2023-05-10 RX ORDER — AMOXICILLIN 250 MG
2 CAPSULE ORAL NIGHTLY
Status: DISCONTINUED | OUTPATIENT
Start: 2023-05-10 | End: 2023-05-10 | Stop reason: HOSPADM

## 2023-05-10 RX ADMIN — TRANEXAMIC ACID 1000 MG: 100 INJECTION, SOLUTION INTRAVENOUS at 09:00

## 2023-05-10 RX ADMIN — ONDANSETRON 4 MG: 2 INJECTION INTRAMUSCULAR; INTRAVENOUS at 09:08

## 2023-05-10 RX ADMIN — TRANEXAMIC ACID 1000 MG: 100 INJECTION, SOLUTION INTRAVENOUS at 06:15

## 2023-05-10 RX ADMIN — FENTANYL CITRATE 50 MCG: 50 INJECTION, SOLUTION INTRAMUSCULAR; INTRAVENOUS at 07:11

## 2023-05-10 RX ADMIN — PROPOFOL 150 MCG/KG/MIN: 10 INJECTION, EMULSION INTRAVENOUS at 07:44

## 2023-05-10 RX ADMIN — BUPIVACAINE HYDROCHLORIDE 2 ML: 7.5 INJECTION, SOLUTION EPIDURAL; RETROBULBAR at 07:15

## 2023-05-10 RX ADMIN — OXYCODONE HYDROCHLORIDE 5 MG: 5 TABLET ORAL at 16:12

## 2023-05-10 RX ADMIN — FENTANYL CITRATE 50 MCG: 50 INJECTION, SOLUTION INTRAMUSCULAR; INTRAVENOUS at 07:19

## 2023-05-10 RX ADMIN — PREGABALIN 75 MG: 75 CAPSULE ORAL at 06:15

## 2023-05-10 RX ADMIN — LIDOCAINE HYDROCHLORIDE 30 MG: 10 INJECTION, SOLUTION EPIDURAL; INFILTRATION; INTRACAUDAL; PERINEURAL at 06:40

## 2023-05-10 RX ADMIN — ROPIVACAINE HYDROCHLORIDE 30 ML: 5 INJECTION, SOLUTION EPIDURAL; INFILTRATION; PERINEURAL at 06:40

## 2023-05-10 RX ADMIN — MIDAZOLAM HYDROCHLORIDE 2 MG: 1 INJECTION, SOLUTION INTRAMUSCULAR; INTRAVENOUS at 07:02

## 2023-05-10 RX ADMIN — ACETAMINOPHEN 1000 MG: 500 TABLET, FILM COATED ORAL at 12:00

## 2023-05-10 RX ADMIN — PROPOFOL 50 MCG/KG/MIN: 10 INJECTION, EMULSION INTRAVENOUS at 07:20

## 2023-05-10 RX ADMIN — DEXAMETHASONE SODIUM PHOSPHATE 4 MG: 4 INJECTION, SOLUTION INTRAMUSCULAR; INTRAVENOUS at 07:56

## 2023-05-10 RX ADMIN — SODIUM CHLORIDE, SODIUM GLUCONATE, SODIUM ACETATE, POTASSIUM CHLORIDE AND MAGNESIUM CHLORIDE 1000 ML: 526; 502; 368; 37; 30 INJECTION, SOLUTION INTRAVENOUS at 06:15

## 2023-05-10 RX ADMIN — Medication 2 G: at 07:19

## 2023-05-10 RX ADMIN — ROPIVACAINE HYDROCHLORIDE 30 ML: 5 INJECTION, SOLUTION EPIDURAL; INFILTRATION; PERINEURAL at 06:32

## 2023-05-10 RX ADMIN — MIDAZOLAM HYDROCHLORIDE 1 MG: 1 INJECTION, SOLUTION INTRAMUSCULAR; INTRAVENOUS at 07:41

## 2023-05-10 RX ADMIN — ACETAMINOPHEN 975 MG: 325 TABLET ORAL at 06:15

## 2023-05-10 NOTE — PLAN OF CARE
Goal Outcome Evaluation:  Plan of Care Reviewed With: patient, spouse           Outcome Evaluation: OT eval initiated. Co-eval with PT. Pt alert and agreeable to therapy. Pt was SPV for sup to sit. CGA for STS, Min A x1 for mobility to and from the toilet, and CGA for soilt t/f with [facility] ovr toilet. Pt appeared pale during initial mobility with BP stable. On second attempt for mobility in halls, pt stoof from EOB and became pale and reported lightheadedness. Pt safely returned to seated position and then returned to supine. Pts initial supine BP was 128/80; BP after mobility was 134/72, and BP aafter syncope was 114/71. RN notified and arrived to assess pt. Pt not currently safe to d/c home. I/P OT will follow. Goals established.

## 2023-05-10 NOTE — ANESTHESIA PROCEDURE NOTES
Peripheral Block      Patient reassessed immediately prior to procedure    Patient location during procedure: pre-op  Start time: 5/10/2023 6:40 AM  Stop time: 5/10/2023 6:50 AM  Reason for block: procedure for pain, at surgeon's request, post-op pain management and secondary anesthetic  Performed by  Anesthesiologist: Rachel Pollack DO  Preanesthetic Checklist  Completed: patient identified, IV checked, site marked, risks and benefits discussed, surgical consent, monitors and equipment checked, pre-op evaluation and timeout performed  Prep:  Pt Position: supine  Sterile barriers:cap, gloves and sterile barriers  Prep: ChloraPrep  Patient monitoring: blood pressure monitoring, continuous pulse oximetry and EKG  Procedure    Sedation: no  Performed under: PNB  Guidance:ultrasound guided    ULTRASOUND INTERPRETATION.  Using ultrasound guidance a 22 G gauge needle was placed in close proximity to the femoral nerve, at which point, under ultrasound guidance anesthetic was injected in the area of the nerve and spread of the anesthesia was seen on ultrasound in close proximity thereto.  There were no abnormalities seen on ultrasound; a digital image was taken; and the patient tolerated the procedure with no complications. Images:still images obtained, printed/placed on chart    Laterality:right  Block Type:fascia iliaca compartment  Injection Technique:single-shot  Needle Type:echogenic  Needle Gauge:22 G  Resistance on Injection: none    Medications Used: lidocaine PF 1% (XYLOCAINE) injection - Injection   30 mg - 5/10/2023 6:40:00 AM  ropivacaine (NAROPIN) 0.5 % injection - Injection   30 mL - 5/10/2023 6:40:00 AM      Post Assessment  Injection Assessment: negative aspiration for heme, no paresthesia on injection and incremental injection  Patient Tolerance:comfortable throughout block  Complications:no  Additional Notes  Pt & side identified  U/S used throughout and needle seen throughout  No complications  Pt  tolerated procedure well

## 2023-05-10 NOTE — PLAN OF CARE
Goal Outcome Evaluation:  Plan of Care Reviewed With: patient, spouse           Outcome Evaluation: PT evaluation initiated.  Patient is alert and cooperative.  He requires SPV with bed mobility, CGA with transfers, min Ax1 with gait.  Patient ambulates 15'x2 with FWW, step-to gait, cues to keep head up.  Patient pale during first bout of gait, BP stable.  Patient then stands from bed for 3rd bout of gait, becomes more pale and then reports lightheadedness.  Patient returned safely to seated position on bed.  Attempted assessment of BP but s/s worsened and patient was safely returned to supine.  Supine BP initially: 128/80, seated after 1st bout of gait: 134/72, supine after syncope: 114/71.  RN notified and arrived to assess patient.  Patient is not currently ready for discharge home.  Goals established, continue skilled I/P PT.

## 2023-05-10 NOTE — THERAPY EVALUATION
Patient Name: Everette Mckeon  : 1965    MRN: [id number]                              Today's Date: 5/10/2023       Admit Date: 5/10/2023    Visit Dx:     ICD-10-CM ICD-9-CM   1. Gait abnormality  R26.9 781.2   2. Essential hypertension  I10 401.9   3. Right hip pain  M25.551 719.45   4. Primary osteoarthritis of right hip  M16.11 715.15   5. Impaired functional mobility, balance, gait, and endurance  Z74.09 V49.89   6. Impaired mobility and ADLs  Z74.09 V49.89    Z78.9      Patient Active Problem List   Diagnosis   • Right hip pain   • Primary osteoarthritis of right hip   • Degenerative scoliosis   • DDD (degenerative disc disease), lumbar   • Lumbar radiculopathy   • Left leg pain   • Numbness and tingling of left leg   • Foraminal stenosis of lumbar region   • Chronic left-sided low back pain with left-sided sciatica   • Spinal stenosis of lumbar region without neurogenic claudication   • Facet arthropathy, lumbar   • Left hip pain   • Gait abnormality   • Limited mobility   • Essential hypertension     Past Medical History:   Diagnosis Date   • Asthma     childhood   • Degenerative scoliosis    • Essential hypertension 2023   • GERD (gastroesophageal reflux disease)    • History of stomach ulcers    • Primary osteoarthritis of right hip 2021   • Sleep apnea     sleeps with c-pap     Past Surgical History:   Procedure Laterality Date   • GALLBLADDER SURGERY     • HAND SURGERY Left     Partial amputation ring finger   • KNEE ARTHROSCOPY Left    • KNEE SURGERY Left     arthroscopy   • VASECTOMY        General Information     Row Name 05/10/23 1411 05/10/23 1231       OT Time and Intention    Document Type evaluation  -CM evaluation  -CM    Mode of Treatment physical therapy;occupational therapy  -CM physical therapy;occupational therapy  -CM    Row Name 05/10/23 1411 05/10/23 1231       General Information    Patient Profile Reviewed yes  -CM yes  -CM    Prior Level of Function  -- independent:;all household mobility;community mobility;ADL's  -CM    Row Name 05/10/23 1231          Living Environment    People in Home spouse  -CM     Row Name 05/10/23 1231          Home Main Entrance    Number of Stairs, Main Entrance four  -CM     Stair Railings, Main Entrance railing on left side (ascending)  -CM     Row Name 05/10/23 1231          Stairs Within Home, Primary    Stairs, Within Home, Primary (I) ambulation without an AD at work, used crutches outside of work. Walk in shower with seat, raised toilet with grab rails.  -CM     Number of Stairs, Within Home, Primary none  -CM     Row Name 05/10/23 1411 05/10/23 1231       Cognition    Orientation Status (Cognition) oriented x 4  -CM oriented x 4  -CM    Row Name 05/10/23 1411 05/10/23 1231       Safety Issues, Functional Mobility    Impairments Affecting Function (Mobility) pain;strength;endurance/activity tolerance  -CM strength;endurance/activity tolerance;pain;balance  -CM          User Key  (r) = Recorded By, (t) = Taken By, (c) = Cosigned By    Initials Name Provider Type    CM Elidia Neves OT Occupational Therapist                 Mobility/ADL's     Row Name 05/10/23 1411 05/10/23 1231       Bed Mobility    Bed Mobility supine-sit;sit-supine;scooting/bridging  -CM supine-sit;sit-supine;scooting/bridging  -CM    Scooting/Bridging [place] (Bed Mobility) supervision  -CM supervision  -CM    Supine-Sit [place] (Bed Mobility) supervision  -CM supervision  -CM    Sit-Supine [place] (Bed Mobility) supervision  -CM maximum assist (25% patient effort);2 person assist  -CM    Assistive Device (Bed Mobility) head of bed elevated  -CM head of bed elevated  -CM    Row Name 05/10/23 1411          Transfers    Transfers sit-stand transfer;stand-sit transfer;toilet transfer  -CM     Row Name 05/10/23 1411 05/10/23 1231       Sit-Stand Transfer    Sit-Stand Urs[place] (Transfers) contact guard  -CM contact guard  -CM    Assistive Device  (Sit-Stand Transfers) walker, front-wheeled  -CM walker, front-wheeled  -CM    Row Name 05/10/23 1411          Stand-Sit Transfer    Stand-Sit Miller (Transfers) contact guard  -CM     Assistive Device (Stand-Sit Transfers) walker, front-wheeled  -CM     Row Name 05/10/23 1411          Toilet Transfer    Type (Toilet Transfer) stand-sit;sit-stand  -CM     Assistive Device (Toilet Transfer) commode, 3-in-1  -CM     Row Name 05/10/23 1411 05/10/23 1231       Functional Mobility    Functional Mobility- Ind. Level contact guard assist  -CM contact guard assist  -CM    Functional Mobility- Device walker, front-wheeled  -CM walker, front-wheeled  -CM    Functional Mobility-Distance (Feet) 20  -CM 20  -CM    Ambulated day of surgery or within 4 hours of PACU discharge yes  -CM --    Row Name 05/10/23 1411 05/10/23 1231       Mobility    Extremity Weight-bearing Status right lower extremity  -CM right lower extremity  -CM    Right Lower Extremity (Weight-bearing Status) weight-bearing as tolerated (WBAT)  -CM weight-bearing as tolerated (WBAT)  -CM          User Key  (r) = Recorded By, (t) = Taken By, (c) = Cosigned By    Initials Name Provider Type    Elidia Ha OT Occupational Therapist               Obj/Interventions     Row Name 05/10/23 1231          Sensory Assessment (Somatosensory)    Sensory Assessment (Somatosensory) UE sensation intact  -CM     Row Name 05/10/23 1231          Range of Motion Comprehensive    General Range of Motion bilateral upper extremity ROM WFL  -CM     Row Name 05/10/23 1231          Strength Comprehensive (MMT)    Comment, General Manual Muscle Testing (MMT) Assessment BUE 5/5 grossly  -CM           User Key  (r) = Recorded By, (t) = Taken By, (c) = Cosigned By    Initials Name Provider Type    Elidia Ha OT Occupational Therapist               Goals/Plan     Row Name 05/10/23 1230          Transfer Goal 1 (OT)    Activity/Assistive Device (Transfer Goal 1, OT)  toilet;walk-in shower  -CM     [place] Level/Cues Needed (Transfer Goal 1, OT) standby assist  -CM     Time Frame (Transfer Goal 1, OT) long term goal (LTG);by discharge  -CM     Progress/Outcome (Transfer Goal 1, OT) goal not met  -CM     Row Name 05/10/23 1230          Bathing Goal 1 (OT)    Activity/Device (Bathing Goal 1, OT) lower body bathing  -CM     [place] Level/Cues Needed (Bathing Goal 1, OT) standby assist  -CM     Time Frame (Bathing Goal 1, OT) long term goal (LTG);by discharge  -CM     Progress/Outcomes (Bathing Goal 1, OT) goal not met  -CM     Row Name 05/10/23 1230          Dressing Goal 1 (OT)    Activity/Device (Dressing Goal 1, OT) lower body dressing  -CM     [place]/Cues Needed (Dressing Goal 1, OT) minimum assist (75% or more patient effort)  -CM     Time Frame (Dressing Goal 1, OT) long term goal (LTG);by discharge  -CM     Progress/Outcome (Dressing Goal 1, OT) goal not met  -CM     Row Name 05/10/23 1230          Therapy Assessment/Plan (OT)    Planned Therapy Interventions (OT) activity tolerance training;adaptive equipment training;BADL retraining;manual therapy/joint mobilization;cognitive/visual perception retraining;IADL retraining;functional balance retraining;edema control/reduction;neuromuscular control/coordination retraining;occupation/activity based interventions;ROM/therapeutic exercise;patient/caregiver education/training;passive ROM/stretching;transfer/mobility retraining;strengthening exercise  -CM           User Key  (r) = Recorded By, (t) = Taken By, (c) = Cosigned By    Initials Name Provider Type    Elidia Ha OT Occupational Therapist               Clinical Impression     Row Name 05/10/23 1411 05/10/23 1231       Pain Assessment    Pretreatment Pain Rating 4/10  -CM 3/10  -CM    Posttreatment Pain Rating 4/10  -CM 3/10  -CM    Pain Location - Side/Orientation Right  -CM --    Pain Location - hip  -CM --    Pain Intervention(s) Medication (See  MAR);Repositioned;Ambulation/increased activity  -CM --    Row Name 05/10/23 1411 05/10/23 1231       Plan of Care Review    Plan of Care Reviewed With patient;spouse  -CM patient;spouse  -CM    Outcome Evaluation OT eval completed. Pt alert and agreeable to therapy. Motivated to return home. No c/o of lightheadedness or dizziness throughout session. Pt was SPV for bed mobility, CGA for STS with FWW, CGA for mobility to and from the toilet x2, CGA for toilet t/f with BSC over toilet x2. Pt educated on ADL sequencing. Pt was left supine in bed with all needs in reach. Pt safe to d/c home from OT perspective. Pt will have spouse at home to assist as needed. Recommend d/c home with assist. RN notified.  -CM OT eval initiated. Co-eval with PT. Pt alert and agreeable to therapy. Pt was SPV for sup to sit. CGA for STS, Min A x1 for mobility to and from the toilet, and CGA for soilt t/f with BSC ovr toilet. Pt appeared pale during initial mobility with BP stable. On second attempt for mobility in halls, pt stoof from EOB and became pale and reported lightheadedness. Pt safely returned to seated position and then returned to supine. Pts initial supine BP was 128/80; BP after mobility was 134/72, and BP aafter syncope was 114/71. RN notified and arrived to assess pt. Pt not currently safe to d/c home. I/P OT will follow. Goals established.  -CM    Row Name 05/10/23 1411 05/10/23 1231       Therapy Assessment/Plan (OT)    Patient/Family Therapy Goal Statement (OT) -- return home  -CM    Rehab Potential (OT) good, to achieve stated therapy goals  -CM good, to achieve stated therapy goals  -CM    Criteria for Skilled Therapeutic Interventions Met (OT) yes;meets criteria  -CM yes;meets criteria  -CM    Therapy Frequency (OT) daily  -CM daily  -CM    Predicted Duration of Therapy Intervention (OT) -- until d/c or all goals met  -CM    Row Name 05/10/23 1411          Therapy Plan Review/Discharge Plan (OT)    Anticipated Discharge  Disposition (OT) home with assist  -CM     Row Name 05/10/23 1411 05/10/23 1231       Vital Signs    Pre Systolic BP Rehab 139  -  -CM    Pre Treatment Diastolic BP 91  -CM 80  -CM    Intra Systolic BP Rehab -- 134  -CM    Intra Treatment Diastolic BP -- 72  -CM    Post Systolic BP Rehab 131  -  -CM    Post Treatment Diastolic BP 83  -CM 71  -CM    Pretreatment Heart Rate (beats/min) 106  -CM 94  -CM    Posttreatment Heart Rate (beats/min) 105  -CM --    Pre SpO2 (%) 95  -CM 97  -CM    O2 Delivery Pre Treatment room air  -CM room air  -CM    Pre Patient Position Supine  -CM Supine  -CM    Intra Patient Position -- Sitting  -CM    Post Patient Position Supine  -CM Supine  -CM    Row Name 05/10/23 1411 05/10/23 1231       Positioning and Restraints    Pre-Treatment Position in bed  -CM in bed  -CM    Post Treatment Position bed  -CM bed  -CM    In Bed notified nsg;with nsg;supine;call light within reach;encouraged to call for assist;with family/caregiver  -CM notified nsg;supine;call light within reach;encouraged to call for assist;with family/caregiver;patient within staff view  -CM          User Key  (r) = Recorded By, (t) = Taken By, (c) = Cosigned By    Initials Name Provider Type    Elidia Ha OT Occupational Therapist               Outcome Measures     Row Name 05/10/23 1231          How much help from another is currently needed...    Putting on and taking off regular lower body clothing? 2  -CM     Bathing (including washing, rinsing, and drying) 3  -CM     Toileting (which includes using toilet bed pan or urinal) 4  -CM     Putting on and taking off regular upper body clothing 4  -CM     Taking care of personal grooming (such as brushing teeth) 4  -CM     Eating meals 4  -CM     AM-PAC 6 Clicks Score (OT) 21  -CM     Row Name 05/10/23 1230          How much help from another person do you currently need...    Turning from your back to your side while in flat bed without using bedrails? 3   -CZ     Moving from lying on back to sitting on the side of a flat bed without bedrails? 3  -CZ     Moving to and from a bed to a chair (including a wheelchair)? 3  -CZ     Standing up from a chair using your arms (e.g., wheelchair, bedside chair)? 3  -CZ     Climbing 3-5 steps with a railing? 3  -CZ     To walk in hospital room? 3  -CZ     AM-PAC 6 Clicks Score (PT) 18  -CZ     Highest level of mobility 6 --> Walked 10 steps or more  -CZ     Row Name 05/10/23 1231 05/10/23 1230       Functional Assessment    Outcome Measure Options AM-PAC 6 Clicks Daily Activity (OT)  -CM AM-PAC 6 Clicks Basic Mobility (PT)  -CZ          User Key  (r) = Recorded By, (t) = Taken By, (c) = Cosigned By    Initials Name Provider Type    CZ Tyshawn Israel, PT Physical Therapist    CM Elidia Neves, OT Occupational Therapist                Occupational Therapy Education     Title: PT OT SLP Therapies (In Progress)     Topic: Occupational Therapy (In Progress)     Point: ADL training (Done)     Description:   Instruct learner(s) on proper safety adaptation and remediation techniques during self care or transfers.   Instruct in proper use of assistive devices.              Learning Progress Summary           Patient Acceptance, E,TB, VU by CM at 5/10/2023 1341    Comment: OT POC, Role of OT, d/c recommendations, AD/DME needs at d/c   Family Acceptance, E,TB, VU by CM at 5/10/2023 1341    Comment: OT POC, Role of OT, d/c recommendations, AD/DME needs at d/c                   Point: Home exercise program (Not Started)     Description:   Instruct learner(s) on appropriate technique for monitoring, assisting and/or progressing therapeutic exercises/activities.              Learner Progress:  Not documented in this visit.          Point: Precautions (Done)     Description:   Instruct learner(s) on prescribed precautions during self-care and functional transfers.              Learning Progress Summary           Patient Acceptance, E,TB, VU by  CM at 5/10/2023 1341    Comment: OT POC, Role of OT, d/c recommendations, AD/DME needs at d/c   Family Acceptance, E,TB, VU by CM at 5/10/2023 1341    Comment: OT POC, Role of OT, d/c recommendations, AD/DME needs at d/c                   Point: Body mechanics (Done)     Description:   Instruct learner(s) on proper positioning and spine alignment during self-care, functional mobility activities and/or exercises.              Learning Progress Summary           Patient Acceptance, E,TB, VU by CM at 5/10/2023 1341    Comment: OT POC, Role of OT, d/c recommendations, AD/DME needs at d/c   Family Acceptance, E,TB, VU by CM at 5/10/2023 1341    Comment: OT POC, Role of OT, d/c recommendations, AD/DME needs at d/c                               User Key     Initials Effective Dates Name Provider Type Discipline    YEHUDA 11/18/22 -  Elidia Neves OT Occupational Therapist OT              OT Recommendation and Plan  Planned Therapy Interventions (OT): activity tolerance training, adaptive equipment training, BADL retraining, manual therapy/joint mobilization, cognitive/visual perception retraining, IADL retraining, functional balance retraining, edema control/reduction, neuromuscular control/coordination retraining, occupation/activity based interventions, ROM/therapeutic exercise, patient/caregiver education/training, passive ROM/stretching, transfer/mobility retraining, strengthening exercise  Therapy Frequency (OT): daily  Plan of Care Review  Plan of Care Reviewed With: patient, spouse  Outcome Evaluation: OT eval completed. Pt alert and agreeable to therapy. Motivated to return home. No c/o of lightheadedness or dizziness throughout session. Pt was SPV for bed mobility, CGA for STS with FWW, CGA for mobility to and from the toilet x2, CGA for toilet t/f with BSC over toilet x2. Pt educated on ADL sequencing. Pt was left supine in bed with all needs in reach. Pt safe to d/c home from OT perspective. Pt will have spouse at  home to assist as needed. Recommend d/c home with assist. RN notified.     Time Calculation:    Time Calculation- OT     Row Name 05/10/23 1511 05/10/23 1508 05/10/23 1503       Time Calculation- OT    OT Start Time 1410  -CM 1230  -CM --    OT Stop Time 1500  -CM 1326  -CM --    OT Time Calculation (min) 50 min  -CM 56 min  -CM --    OT Non-Billable Time (min) 36 min  -CM -- --    OT Received On 05/10/23  -CM 05/10/23  -CM --    OT Goal Re-Cert Due Date -- 05/23/23  -CM --       Timed Charges    52066 - Gait Training Minutes  -- -- 36  -CZ       Untimed Charges    OT Eval/Re-eval Minutes 14  -CM 56  -CM --       Total Minutes    Timed Charges Total Minutes -- -- 36  -CZ    Untimed Charges Total Minutes 14  -CM 56  -CM --     Total Minutes 14  -CM 56  -CM 36  -CZ          User Key  (r) = Recorded By, (t) = Taken By, (c) = Cosigned By    Initials Name Provider Type    CZ Tyshawn Israel, PT Physical Therapist    CM Elidia Neves OT Occupational Therapist              Therapy Charges for Today     Code Description Service Date Service Provider Modifiers Qty    [id number] HC-OT EVAL MOD COMPLEXITY 5 5/10/2023 Elidia Neves OT  1               Elidia Neves OT  5/10/2023

## 2023-05-10 NOTE — INTERVAL H&P NOTE
H&P reviewed. The patient was examined and there are no changes to the H&P.      Vitals:    05/10/23 0609   BP: 161/98   Pulse: 79   Resp: 20   Temp: 97.6 °F (36.4 °C)   SpO2: 99%       No Known Allergies    Prior to Admission medications    Medication Sig Start Date End Date Taking? Authorizing Provider   oxyCODONE-acetaminophen (PERCOCET) 5-325 MG per tablet Take 1 tablet by mouth Every 4 (Four) Hours As Needed for Moderate Pain or Severe Pain. 1/31/23  Yes Guess, AGNES Dickson   tiZANidine (ZANAFLEX) 4 MG tablet Take 1 tablet by mouth Every 8 (Eight) Hours As Needed for Muscle Spasms. 8/1/22  Yes Guess, AGNES Dickson   lisinopril (PRINIVIL,ZESTRIL) 10 MG tablet Take 1 tablet by mouth Every Night. 9/18/21   ProviderSandra MD   meloxicam (MOBIC) 15 MG tablet Take 1 tablet by mouth Every Night.    ProviderSandra MD   omeprazole (priLOSEC) 40 MG capsule Take 1 capsule by mouth Every Night.    ProviderSandra MD       Past Medical History:   Diagnosis Date    Asthma     childhood    Degenerative scoliosis     Essential hypertension 04/13/2023    GERD (gastroesophageal reflux disease)     History of stomach ulcers     Primary osteoarthritis of right hip 03/29/2021    Sleep apnea     sleeps with c-pap       Past Surgical History:   Procedure Laterality Date    GALLBLADDER SURGERY      HAND SURGERY Left 2000    Partial amputation ring finger    KNEE ARTHROSCOPY Left 1984    KNEE SURGERY Left 1985    arthroscopy    VASECTOMY         Social History     Socioeconomic History    Marital status:    Tobacco Use    Smoking status: Never    Smokeless tobacco: Never   Vaping Use    Vaping Use: Never used   Substance and Sexual Activity    Alcohol use: No    Drug use: No    Sexual activity: Defer       Family History   Problem Relation Age of Onset    Hypertension Mother     Cancer Mother     Hypertension Father     Hypertension Brother              This document has been electronically signed by Rene  Rosemary Franklin MD on May 10, 2023 06:29 CDT

## 2023-05-10 NOTE — OP NOTE
TOTAL HIP ARTHROPLASTY ANTERIOR  Procedure Note    Name:    Everette Mckeon  YOB: 1965  Date of surgery:   5/10/2023    Pre-op Diagnosis:   Right hip pain [M25.551]  Primary osteoarthritis of right hip [M16.11]  Essential hypertension [I10]  Gait abnormality [R26.9]    Post-op Diagnosis:    Post-Op Diagnosis Codes:     * Right hip pain [M25.551]     * Primary osteoarthritis of right hip [M16.11]     * Essential hypertension [I10]     * Gait abnormality [R26.9]    Procedure:  Procedure(s):  RIGHT TOTAL HIP ARTHROPLASTY ANTERIOR       Surgical Approach: Hip Direct Anterior (Mckeon-Smith)         Surgeon:  Surgeon(s):  Rene Franklin MD    Assistant: Serena Ralph CSA was responsible for performing the following activities: Retraction, Suction, Irrigation, Suturing, Closing and Placing Dressing and their skilled assistance was necessary for the success of this case.     Anesthesia: Regional, Spinal    Staff:   Circulator: Lauren Stoddard RN; Whitney Sidhu RN  Radiology Technologist: Traci Urbina  Scrub Person: Tri Alcazar  Vendor Representative: Singh Case  Assistant: Serena Ralph CSA    Estimated Blood Loss: 200 mL        Specimens:                ID Type Source Tests Collected by Time   A : RIGHT FEMORAL HEAD Tissue Hip, Right TISSUE EXAM, [facility] ([facility]) Rene Franklin MD 5/10/2023 0854         Drains: * No LDAs found *    Complications: None    IMPLANTS:   Implant Name Type Inv. Item Serial No.  Lot No. LRB No. Used Action   DEV WND/CLS TISS STRATAFIX ABS SYMM PDS/PLS CT1 SZ2 24CM HERNANDEZ - [id number] Implant DEV WND/CLS TISS STRATAFIX ABS SYMM PDS/PLS CT1 SZ2 24CM HERNANDEZ  [employer]  DIV OF J AND J [id number] Right 1 Implanted   DEV WND/CLS TISS STRATAFIX ABS SYMM PDS/PLS CT1 SZ0 24CM HERNANDEZ - [id number] Implant DEV WND/CLS TISS STRATAFIX ABS SYMM PDS/PLS CT1 SZ0 24CM HERNANDEZ  [employer]  DIV OF J AND J [id number] Right 1 Implanted   DEV CONTRL TISS  STRATAFIXSPIRALMNCRYL PLSPS2 REV4/0 30CM - [id number] Implant DEV CONTRL TISS STRATAFIXSPIRALMNCRYL PLSPS2 REV4/0 30CM  [employer]  DIV OF J AND J [id number] Right 1 Implanted   LINER ACET ALTRX FACECHG [id number] PLS4 - [id number] Implant LINER ACET ALTRX FACECHG [id number] PLS4  DEPUY [id number] Right 1 Implanted   CUP ACET PINN SECTOR W GRIPTN 54MM - [id number] Implant CUP ACET PINN SECTOR W GRIPTN 54MM  DEPUY [id number] Right 1 Implanted   SCRW CANC PINN 6.5X30MM - [id number] Implant SCRW CANC PINN 6.5X30MM  DEPUY [id number] Right 1 Implanted   STEM FEM CORAIL CMTLS W/COL AMT SZ11 - [id number] Implant STEM FEM CORAIL CMTLS W/COL AMT SZ11  DEPUY [id number] Right 1 Implanted   HD FEM BIOLOXDELTA/ART CERAM 36MM PLS8.5 - [id number] Implant HD FEM BIOLOXDELTA/ART CERAM 36MM PLS8.5  DEPUY [id number] Right 1 Implanted         PROCEDURE:  The patient was taken to the operating room and placed in the supine position. A sequential compression device was carefully placed on the nonoperative leg. Preoperative antibiotics were administered. Surgical time out was performed. After adequate induction of anesthesia, the feet were padded and placed in the [place] table boots. The patient was then transferred onto the [place] table and positioned appropriately. The Right hip was then prepped and draped in the usual sterile fashion.    An incision was then made starting 2 cm lateral to the ASIS heading distal and lateral at approximately 30 degrees. The subcutaneous fat was then divided down to the fascia overlying the tensor fascia magy (TFL) muscle. This was sharply divided staying a few cm lateral to the interval between the sartorius and the TFL muscle. This interval was then bluntly dissected. The circumflex vessels were then identified, cauterized, and divided. There was excellent hemostasis. Cobra retractors were then placed around the femoral neck capsule. The anterior capsule was then resected. The retractors were then placed intracapsular and the femoral  "neck was identified. The arthritic change was noted to be moderate in the femoral head and along the rim of the acetabulum. The femoral neck cut was made and the femoral head was then engaged with the corkscrew and removed without difficulty. The head was found to be misshapen and devoid of cartilage over most of the joint surface. There were significant osteophytes noted around the periphery of the head.    The acetabulum was then exposed with \"number 7\" retractors. The acetabulum was then addressed with C-arm imaging and the acetabular reamers. We reamed out the medial osteophyte and then up to a solid 'rim' fit. A trial cup was then impacted into position with good fixation. The trial was removed and the hip copiously irrigated. The final acetabular implant was then placed and impacted into position with C-arm guidance.  A single acetabular screw was then placed with excellent purchase to supplement cup stability. The final liner was then placed and then the wound was irrigated once again.     Attention was turned to the femur. The femoral elevator hook was placed. The leg was then moved to the external rotation, extension, and adduction position. Retractors were placed around the proximal femur and then the posterior capsule and conjoined tendon were released. The box osteotome was then used to create the starting hole. The femur was then prepared using the chili-pepper broach and then we progressively broached up the final broach which fit nicely with excellent rotational and axial stability. The trial neck and head were then placed and the hip was then reduced and C-arm images were taken which confirmed appropriate fit and position of the implants. There were no complicating factors noted, and fluoro images were taken which confirmed proper restoration of leg length and offset. The trial components were removed, the hip was copiously irrigated and the final implants were then seated. The hip was then reduced " and found to be stable. C-arm images again confirmed appropriate anatomy restoration without complicating factors noted.     The hip was then copiously irrigated.  There was excellent hemostasis. Hemostasis was felt to be obtained and therefore a drain was not utilized.  We closed the hip in multiple layers in standard fashion. With final capsular closure, the Zynrelef product was injected into the joint space.  Sterile dressing applied. At the end of the case, the sponge and needle counts were reported as being correct. There were no known complications. The patient was then transported to the recovery room.          05/10/23 at 09:36 CDT by Rene Franklin MD

## 2023-05-10 NOTE — PLAN OF CARE
Goal Outcome Evaluation:  Plan of Care Reviewed With: patient, spouse           Outcome Evaluation: OT eval completed. Pt alert and agreeable to therapy. Motivated to return home. No c/o of lightheadedness or dizziness throughout session. Pt was SPV for bed mobility, CGA for STS with FWW, CGA for mobility to and from the toilet x2, CGA for toilet t/f with BSC over toilet x2. Pt educated on ADL sequencing. Pt was left supine in bed with all needs in reach. Pt safe to d/c home from OT perspective. Pt will have spouse at home to assist as needed. Recommend d/c home with assist. RN notified.

## 2023-05-10 NOTE — ANESTHESIA PREPROCEDURE EVALUATION
Anesthesia Evaluation     Patient summary reviewed and Nursing notes reviewed   no history of anesthetic complications:  NPO Solid Status: > 8 hours  NPO Liquid Status: > 4 hours           Airway   Mallampati: III  TM distance: <3 FB  Neck ROM: full  Possible difficult intubation and Anterior  Dental - normal exam     Pulmonary     breath sounds clear to auscultation  (+) asthma,sleep apnea on CPAP,   (-) COPD, shortness of breath, recent URI, rhonchi, decreased breath sounds, wheezes, not a smoker  Cardiovascular   Exercise tolerance: good (4-7 METS)    ECG reviewed  Rhythm: regular  Rate: normal    (+) hypertension less than 2 medications,   (-) pacemaker, past MI, dysrhythmias, angina, murmur, peripheral edema, cardiac stents, CABG, DVT    ROS comment: EKG 5/1/2023:  Normal sinus rhythm with sinus arrhythmia  Normal ECG  When compared with ECG of 26-SEP-2014 01:22,  No significant change was found    Neuro/Psych  (-) seizures, TIA, CVA  GI/Hepatic/Renal/Endo    (+) obesity,  GERD well controlled, PUD,    (-) morbid obesity, no renal disease, diabetes    Musculoskeletal     (+) back pain,   Abdominal    Substance History      OB/GYN          Other   arthritis,      ROS/Med Hx Other: FINDINGS:  Degenerative arthritis on the right hip with near  complete loss of joint space. There is associated subchondral  sclerosis and cystic change as well as marginal hypertrophic  osteophytes. More mild degenerative arthritis about the left hip  with relative preservation of joint space. No acute fracture. No  dislocation.     IMPRESSION:  Moderate/advanced degenerative arthritis involving  the right hip    Hx of asthma as a child. No longer uses or needs inhalers.                   Anesthesia Plan    ASA 2     regional and spinal     (Hgb=14.3, T&S  Discussed peripheral nerve block (fascia iliaca) for post op pain relief and patient understands possible complications, risks, & agrees.)  intravenous induction     Anesthetic  plan, risks, benefits, and alternatives have been provided, discussed and informed consent has been obtained with: patient, spouse/significant other and child.  Pre-procedure education provided  Use of blood products discussed with patient  Consented to blood products.   Plan discussed with CRNA.        CODE STATUS:

## 2023-05-10 NOTE — ADDENDUM NOTE
Addendum  created 05/10/23 0949 by Rachel Pollack,     Child order released for a procedure order, Clinical Note Signed, Intraprocedure Blocks edited, SmartForm saved

## 2023-05-10 NOTE — THERAPY EVALUATION
Patient Name: Everette Mckeon  : 1965    MRN: [id number]                              Today's Date: 5/10/2023       Admit Date: 5/10/2023    Visit Dx:     ICD-10-CM ICD-9-CM   1. Gait abnormality  R26.9 781.2   2. Essential hypertension  I10 401.9   3. Right hip pain  M25.551 719.45   4. Primary osteoarthritis of right hip  M16.11 715.15   5. Impaired functional mobility, balance, gait, and endurance  Z74.09 V49.89     Patient Active Problem List   Diagnosis   • Right hip pain   • Primary osteoarthritis of right hip   • Degenerative scoliosis   • DDD (degenerative disc disease), lumbar   • Lumbar radiculopathy   • Left leg pain   • Numbness and tingling of left leg   • Foraminal stenosis of lumbar region   • Chronic left-sided low back pain with left-sided sciatica   • Spinal stenosis of lumbar region without neurogenic claudication   • Facet arthropathy, lumbar   • Left hip pain   • Gait abnormality   • Limited mobility   • Essential hypertension     Past Medical History:   Diagnosis Date   • Asthma     childhood   • Degenerative scoliosis    • Essential hypertension 2023   • GERD (gastroesophageal reflux disease)    • History of stomach ulcers    • Primary osteoarthritis of right hip 2021   • Sleep apnea     sleeps with c-pap     Past Surgical History:   Procedure Laterality Date   • GALLBLADDER SURGERY     • HAND SURGERY Left     Partial amputation ring finger   • KNEE ARTHROSCOPY Left    • KNEE SURGERY Left     arthroscopy   • VASECTOMY        General Information     Row Name 05/10/23 1410 05/10/23 1230       Physical Therapy Time and Intention    Document Type evaluation  -CZ evaluation  -CZ    Mode of Treatment physical therapy;occupational therapy  -CZ physical therapy;occupational therapy  -CZ    Row Name 05/10/23 1410 05/10/23 1230       General Information    Patient Profile Reviewed yes  -CZ yes  -CZ    Prior Level of Function -- independent:;all household  mobility;community mobility  -CZ    Row Name 05/10/23 1230          Home Main Entrance    Number of Stairs, Main Entrance four  -CZ     Stair Railings, Main Entrance railing on left side (ascending)  -CZ     Row Name 05/10/23 1230          Stairs Within Home, Primary    Stairs, Within Home, Primary (I) ambulation without an AD at work, used crutches outside of work. Walk in shower with seat, raised toilet with grab rails.  -CZ     Number of Stairs, Within Home, Primary none  -CZ     Row Name 05/10/23 1410 05/10/23 1230       Cognition    Orientation Status (Cognition) oriented x 4  -CZ oriented x 4  -CZ    Row Name 05/10/23 1410 05/10/23 1230       Safety Issues, Functional Mobility    Impairments Affecting Function (Mobility) pain;strength;endurance/activity tolerance  -CZ strength;endurance/activity tolerance;pain;balance  -CZ          User Key  (r) = Recorded By, (t) = Taken By, (c) = Cosigned By    Initials Name Provider Type    CZ Tyshawn Israel, PT Physical Therapist               Mobility     Row Name 05/10/23 1410 05/10/23 1230       Bed Mobility    Bed Mobility -- supine-sit;sit-supine;scooting/bridging  -CZ    Scooting/Bridging [place] (Bed Mobility) -- supervision  -CZ    Supine-Sit [place] (Bed Mobility) supervision  -CZ supervision  -CZ    Sit-Supine [place] (Bed Mobility) supervision  -CZ maximum assist (25% patient effort);2 person assist  -CZ    Assistive Device (Bed Mobility) head of bed elevated  -CZ head of bed elevated  -    Row Name 05/10/23 1410 05/10/23 1230       Sit-Stand Transfer    Sit-Stand [place] (Transfers) contact guard  -CZ contact guard  -CZ    Assistive Device (Sit-Stand Transfers) walker, front-wheeled  -CZ walker, front-wheeled  -CZ    Row Name 05/10/23 1410 05/10/23 1230       Gait/Stairs (Locomotion)    [place] Level (Gait) contact guard  -CZ minimum assist (75% patient effort)  -CZ    Assistive Device (Gait) walker, front-wheeled  -CZ walker,  front-wheeled  -CZ    Distance in Feet (Gait) 15'x2. 150'x1.  -CZ 15'x2.  -CZ    [place] Level (Stairs) contact guard  -CZ --    Handrail Location (Stairs) left side (ascending);right side (descending)  -CZ --    Ascending Technique (Stairs) step-to-step  -CZ --    Descending Technique (Stairs) step-to-step  -CZ --    Comment, (Gait/Stairs) Faster oliverio, head up, more even step length compared to previous trials.  Very good effort.  Patient's spouse present, able to provide adequate guarding on steps and during gait.  -CZ Step to gait, slow oliverio, relies heavily on UEs.  -CZ    Row Name 05/10/23 1410 05/10/23 1230       Mobility    Extremity Weight-bearing Status right lower extremity  -CZ right lower extremity  -CZ    Right Lower Extremity (Weight-bearing Status) weight-bearing as tolerated (WBAT)  -CZ weight-bearing as tolerated (WBAT)  -CZ          User Key  (r) = Recorded By, (t) = Taken By, (c) = Cosigned By    Initials Name Provider Type    CZ Tyshawn Israel, PT Physical Therapist               Obj/Interventions     Row Name 05/10/23 1230          Range of Motion Comprehensive    General Range of Motion bilateral lower extremity ROM WFL  -CZ     Row Name 05/10/23 1230          Strength Comprehensive (MMT)    Comment, General Manual Muscle Testing (MMT) Assessment RLE: 3/5, LLE: 4/5 grossly.  -CZ     Row Name 05/10/23 1230          Sensory Assessment (Somatosensory)    Sensory Assessment (Somatosensory) right LE  -CZ     Right LE Sensory Assessment light touch awareness;impaired  -CZ           User Key  (r) = Recorded By, (t) = Taken By, (c) = Cosigned By    Initials Name Provider Type    Tyshawn Ferrara, PT Physical Therapist               Goals/Plan     Row Name 05/10/23 1230          Bed Mobility Goal 1 (PT)    Activity/Assistive Device (Bed Mobility Goal 1, PT) sit to supine/supine to sit  -CZ     [place] Level/Cues Needed (Bed Mobility Goal 1, PT) independent  -CZ     Time Frame (Bed  Mobility Goal 1, PT) by discharge  -CZ     Strategies/Barriers (Bed Mobility Goal 1, PT) R SWAPNA-Ant., WBAT.  -CZ     Progress/Outcomes (Bed Mobility Goal 1, PT) goal not met  -CZ     Row Name 05/10/23 1230          Transfer Goal 1 (PT)    Activity/Assistive Device (Transfer Goal 1, PT) bed-to-chair/chair-to-bed;sit-to-stand/stand-to-sit;walker, rolling  -CZ     [place] Level/Cues Needed (Transfer Goal 1, PT) modified independence  -CZ     Time Frame (Transfer Goal 1, PT) by discharge  -CZ     Strategies/Barriers (Transfers Goal 1, PT) R SWAPNA-Ant., WBAT.  -CZ     Progress/Outcome (Transfer Goal 1, PT) goal not met  -CZ     Row Name 05/10/23 1230          Gait Training Goal 1 (PT)    Activity/Assistive Device (Gait Training Goal 1, PT) walker, rolling  -CZ     [place] Level (Gait Training Goal 1, PT) modified independence  -CZ     Distance (Gait Training Goal 1, PT) 150'x1.  -CZ     Time Frame (Gait Training Goal 1, PT) by discharge  -CZ     Strategies/Barriers (Gait Training Goal 1, PT) R SWAPNA-Ant., WBAT.  -CZ     Progress/Outcome (Gait Training Goal 1, PT) goal not met  -CZ     Row Name 05/10/23 1230          Stairs Goal 1 (PT)    Activity/Assistive Device (Stairs Goal 1, PT) using handrail, left  -CZ     [place] Level/Cues Needed (Stairs Goal 1, PT) supervision required  -CZ     Number of Stairs (Stairs Goal 1, PT) 4 steps, L rail.  -CZ     Time Frame (Stairs Goal 1, PT) by discharge  -CZ     Strategies/Barriers (Stairs Goal 1, PT) R SWAPNA-Ant., WBAT.  -CZ     Progress/Outcome (Stairs Goal 1, PT) goal not met  -CZ     Row Name 05/10/23 1230          Therapy Assessment/Plan (PT)    Planned Therapy Interventions (PT) balance training;bed mobility training;gait training;home exercise program;patient/family education;transfer training;stair training;strengthening;stretching;ROM (range of motion)  -CZ           User Key  (r) = Recorded By, (t) = Taken By, (c) = Cosigned By    Initials Name Provider Type    CZ  Tyshawn Israel, PT Physical Therapist               Clinical Impression     Row Name 05/10/23 1410 05/10/23 1230       Pain    Pretreatment Pain Rating 4/10  -CZ 3/10  -CZ    Posttreatment Pain Rating 4/10  -CZ 3/10  -CZ    Pain Location - Side/Orientation Right  -CZ --    Pain Location - hip  -CZ --    Pain Intervention(s) Medication (See MAR);Repositioned;Ambulation/increased activity;Distraction  -CZ Medication (See MAR);Repositioned;Ambulation/increased activity;Distraction  -CZ    Row Name 05/10/23 1410 05/10/23 1230       Plan of Care Review    Plan of Care Reviewed With patient;spouse  -CZ patient;spouse  -CZ    Outcome Evaluation PT initial evaluation complete.  Patient is alert and very cooperative, sensation much improved, no lightheadedness or dizziness.  Patient ambulates 15'x2, 150'x1 with FWW, head up, equal step length, cues for proper use of walker.  Patient ascends/descends 4 steps with L rail, CGA, already familiar with proper sequencing.  Patient's spouse present, is able to provide adequate guarding on steps and during ambulation. Patient issued FWW.  He will not be able to attend O/P PT until Monday, but his spouse is a therapist, will work with patient over the weekend until patient's appointment on Monday.  Patient is ready for discharge from a PT perspective, RN notified.  -CZ PT evaluation initiated.  Patient is alert and cooperative.  He requires SPV with bed mobility, CGA with transfers, min Ax1 with gait.  Patient ambulates 15'x2 with FWW, step-to gait, cues to keep head up.  Patient pale during first bout of gait, BP stable.  Patient then stands from bed for 3rd bout of gait, becomes more pale and then reports lightheadedness.  Patient returned safely to seated position on bed.  Attempted assessment of BP but s/s worsened and patient was safely returned to supine.  Supine BP initially: 128/80, seated after 1st bout of gait: 134/72, supine after syncope: 114/71.  RN notified and arrived  to assess patient.  Patient is not currently ready for discharge home.  Goals established, continue skilled I/P PT.  -CZ    Row Name 05/10/23 1410 05/10/23 1230       Therapy Assessment/Plan (PT)    Rehab Potential (PT) good, to achieve stated therapy goals  -CZ good, to achieve stated therapy goals  -CZ    Criteria for Skilled Interventions Met (PT) yes;skilled treatment is necessary  -CZ yes;skilled treatment is necessary  -CZ    Therapy Frequency (PT) 2 times/day  -CZ 2 times/day  -CZ    Row Name 05/10/23 1410 05/10/23 1230       Vital Signs    Pre Systolic BP Rehab 139  -  -CZ    Pre Treatment Diastolic BP 91  -CZ 80  -CZ    Intra Systolic BP Rehab -- 134  -CZ    Intra Treatment Diastolic BP -- 72  -CZ    Post Systolic BP Rehab 131  -  -CZ    Post Treatment Diastolic BP 83  -CZ 71  -CZ    Pretreatment Heart Rate (beats/min) 106  -CZ 94  Dizzy, pale.  -CZ    Posttreatment Heart Rate (beats/min) 105  -CZ --    Pre SpO2 (%) 95  -CZ 97  -CZ    O2 Delivery Pre Treatment room air  -CZ room air  -CZ    Pre Patient Position Supine  -CZ Supine  -CZ    Intra Patient Position -- Sitting  -CZ    Post Patient Position Supine  -CZ Supine  -CZ    Row Name 05/10/23 1410 05/10/23 1230       Positioning and Restraints    Pre-Treatment Position in bed  -CZ in bed  -CZ    Post Treatment Position bed  -CZ bed  -CZ    In Bed supine;call light within reach;encouraged to call for assist;with nsg;with family/caregiver  -CZ supine;call light within reach;encouraged to call for assist;with family/caregiver;with nsg  -CZ          User Key  (r) = Recorded By, (t) = Taken By, (c) = Cosigned By    Initials Name Provider Type    Tyshawn Ferrara, PT Physical Therapist               Outcome Measures     Row Name 05/10/23 1230          How much help from another person do you currently need...    Turning from your back to your side while in flat bed without using bedrails? 3  -CZ     Moving from lying on back to sitting on the  side of a flat bed without bedrails? 3  -CZ     Moving to and from a bed to a chair (including a wheelchair)? 3  -CZ     Standing up from a chair using your arms (e.g., wheelchair, bedside chair)? 3  -CZ     Climbing 3-5 steps with a railing? 3  -CZ     To walk in hospital room? 3  -CZ     AM-PAC 6 Clicks Score (PT) 18  -CZ     Highest level of mobility 6 --> Walked 10 steps or more  -     Row Name 05/10/23 1231 05/10/23 1230       Functional Assessment    Outcome Measure Options AM-PAC 6 Clicks Daily Activity (OT)  - AM-PAC 6 Clicks Basic Mobility (PT)  -          User Key  (r) = Recorded By, (t) = Taken By, (c) = Cosigned By    Initials Name Provider Type     Tyshawn Israel, PT Physical Therapist    Elidia Ha OT Occupational Therapist                             Physical Therapy Education     Title: PT OT SLP Therapies (In Progress)     Topic: Physical Therapy (In Progress)     Point: Mobility training (Done)     Learning Progress Summary           Patient Acceptance, E, VU by  at 5/10/2023 1501    Comment: Car transfers, application of ice, sequencing on stairs, O/P PT.    Acceptance, E, NR by  at 5/10/2023 1318    Comment: PT POC, hand placement with transfers, proper use of walker, proper gait mechancics.   Family Acceptance, E, VU by  at 5/10/2023 1501    Comment: Car transfers, application of ice, sequencing on stairs, O/P PT.                   Point: Home exercise program (Not Started)     Learner Progress:  Not documented in this visit.          Point: Body mechanics (Not Started)     Learner Progress:  Not documented in this visit.          Point: Precautions (Not Started)     Learner Progress:  Not documented in this visit.                      User Key     Initials Effective Dates Name Provider Type Discipline     09/18/22 -  Tyshawn Israel, PT Physical Therapist PT              PT Recommendation and Plan  Planned Therapy Interventions (PT): balance training, bed mobility  training, gait training, home exercise program, patient/family education, transfer training, stair training, strengthening, stretching, ROM (range of motion)  Plan of Care Reviewed With: patient, spouse  Outcome Evaluation: PT initial evaluation complete.  Patient is alert and very cooperative, sensation much improved, no lightheadedness or dizziness.  Patient ambulates 15'x2, 150'x1 with FWW, head up, equal step length, cues for proper use of walker.  Patient ascends/descends 4 steps with L rail, CGA, already familiar with proper sequencing.  Patient's spouse present, is able to provide adequate guarding on steps and during ambulation. Patient issued FWW.  He will not be able to attend O/P PT until Monday, but his spouse is a therapist, will work with patient over the weekend until patient's appointment on Monday.  Patient is ready for discharge from a PT perspective, RN notified.     Time Calculation:    PT Charges     Row Name 05/10/23 1503 05/10/23 1502          Time Calculation    Start Time 1410  -CZ 1230  -CZ     Stop Time 1500  -CZ 1326  -CZ     Time Calculation (min) 50 min  -CZ 56 min  -CZ     PT Received On 05/10/23  -CZ 05/10/23  -CZ     PT Goal Re-Cert Due Date -- 05/23/23  -CZ        Time Calculation- PT    Total Timed Code Minutes- PT 36 minute(s)  -CZ --        Timed Charges    11005 - Gait Training Minutes  36  -CZ --        Untimed Charges    PT Eval/Re-eval Minutes 19  -CZ 56  -CZ        Total Minutes    Timed Charges Total Minutes 36  -CZ --     Untimed Charges Total Minutes 19  -CZ 56  -CZ      Total Minutes 55  -CZ 56  -CZ           User Key  (r) = Recorded By, (t) = Taken By, (c) = Cosigned By    Initials Name Provider Type    CZ Tyshawn Israel, PT Physical Therapist              Therapy Charges for Today     Code Description Service Date Service Provider Modifiers Qty    [id number] HC-PT EVAL MOD COMPLEXITY 5 5/10/2023 Tyshawn Israel, PT  1    [id number] HC GAIT TRAINING EA 15 MIN  5/10/2023 Tyshawn Israel, PT GP 2          PT G-Codes  Outcome Measure Options: AM-PAC 6 Clicks Daily Activity (OT)  AM-PAC 6 Clicks Score (PT): 18  AM-PAC 6 Clicks Score (OT): 21  PT Discharge Summary  Anticipated Discharge Disposition (PT): home with assist, home with outpatient therapy services    Tyshawn Israel, PT  5/10/2023

## 2023-05-10 NOTE — PLAN OF CARE
Goal Outcome Evaluation:  Plan of Care Reviewed With: patient, spouse           Outcome Evaluation: PT initial evaluation complete.  Patient is alert and very cooperative, sensation much improved, no lightheadedness or dizziness.  Patient ambulates 15'x2, 150'x1 with FWW, head up, equal step length, cues for proper use of walker.  Patient ascends/descends 4 steps with L rail, CGA, already familiar with proper sequencing.  Patient's spouse present, is able to provide adequate guarding on steps and during ambulation. Patient issued FWW.  He will not be able to attend O/P PT until Monday, but his spouse is a therapist, will work with patient over the weekend until patient's appointment on Monday.  Patient is ready for discharge from a PT perspective, RN notified.

## 2023-05-10 NOTE — ANESTHESIA PROCEDURE NOTES
Spinal Block      Patient reassessed immediately prior to procedure    Patient location during procedure: OR  Start Time: 5/10/2023 7:07 AM  Stop Time: 5/10/2023 7:15 AM  Indication:at surgeon's request  Performed By  CRNA/NADIA: April Fuller CRNA  Preanesthetic Checklist  Completed: patient identified, IV checked, site marked, risks and benefits discussed, surgical consent, monitors and equipment checked, pre-op evaluation and timeout performed  Spinal Block Prep:  Patient Position:sitting  Sterile Tech:cap, gloves, mask and sterile barriers  Prep:Betadine  Patient Monitoring:blood pressure monitoring, continuous pulse oximetry and EKG    Spinal Block Procedure  Approach:midline  Guidance:landmark technique and palpation technique  Needle Type:Pencan  Needle Gauge:24 G  Placement of Spinal needle event:cerebrospinal fluid aspirated  Paresthesia: no  Fluid Appearance:clear  Medications: bupivacaine PF (MARCAINE) 0.75 % injection - Intrathecal, Back   2 mL - 5/10/2023 7:15:00 AM   Post Assessment  Patient Tolerance:patient tolerated the procedure well with no apparent complications  Complications no

## 2023-05-10 NOTE — ANESTHESIA POSTPROCEDURE EVALUATION
Patient: Everette Mckeon    Procedure Summary     Date: 05/10/23 Room / Location: [facility] OR  / [facility] OR    Anesthesia Start: 0702 Anesthesia Stop: 0935    Procedure: RIGHT TOTAL HIP ARTHROPLASTY ANTERIOR                (HANA TABLE AND LARGE HEAD C-ARM)                (REQUEST JODIE VICK AND TANK) (Right: Hip) Diagnosis:       Right hip pain      Primary osteoarthritis of right hip      Essential hypertension      Gait abnormality      (Right hip pain [M25.551])      (Primary osteoarthritis of right hip [M16.11])      (Essential hypertension [I10])      (Gait abnormality [R26.9])    Surgeons: Rene Franklin MD Provider: April Fuller CRNA    Anesthesia Type: regional, spinal ASA Status: 2          Anesthesia Type: regional, spinal    Vitals  No vitals data found for the desired time range.          Post Anesthesia Care and Evaluation    Patient location during evaluation: PACU  Patient participation: waiting for patient participation  Pain management: adequate    Airway patency: patent  Anesthetic complications: No anesthetic complications    Cardiovascular status: hemodynamically stable  Respiratory status: spontaneous ventilation and room air (LMA in place)  Hydration status: acceptable    Comments: Bp- 132/78  HR- 92  O2 sat- 92%  Temp- 97.7  Resp- 18

## 2023-05-10 NOTE — ANESTHESIA PROCEDURE NOTES
Airway  Urgency: elective    Date/Time: 5/10/2023 7:46 AM  End Time:5/10/2023 7:46 AM    General Information and Staff    Patient location during procedure: OR  BLANCA: Delmi Pro SRNA  Indications and Patient Condition  Indications for airway management: airway protection    Preoxygenated: yes  Mask difficulty assessment: 0 - not attempted    Final Airway Details  Final airway type: supraglottic airway      Successful airway: I-gel  Size 4     Number of attempts at approach: 1  Assessment: lips, teeth, and gum same as pre-op and atraumatic intubation

## 2023-05-10 NOTE — ANESTHESIA PROCEDURE NOTES
Peripheral Block      Patient reassessed immediately prior to procedure    Patient location during procedure: holding area  Start time: 5/10/2023 6:32 AM  Stop time: 5/10/2023 6:40 AM  Reason for block: post-op pain management  Performed by  Anesthesiologist: Rachel Pollack DO  Assisted by: Delmi Pro SRNA  Preanesthetic Checklist  Completed: patient identified, IV checked, site marked, risks and benefits discussed, surgical consent, monitors and equipment checked, pre-op evaluation and timeout performed  Prep:  Pt Position: supine  Procedure    Sedation: no  Performed under: local infiltration  Guidance:ultrasound guided    ULTRASOUND INTERPRETATION.  Using ultrasound guidance a gauge needle was placed in close proximity to the femoral nerve, at which point, under ultrasound guidance anesthetic was injected in the area of the nerve and spread of the anesthesia was seen on ultrasound in close proximity thereto.  There were no abnormalities seen on ultrasound; a digital image was taken; and the patient tolerated the procedure with no complications. Images:still images obtained, printed/placed on chart    Laterality:right  Block Type:fascia iliaca compartment  Injection Technique:single-shot  Needle Type:echogenic  Needle Gauge:21 G  Resistance on Injection: none    Medications Used: ropivacaine (NAROPIN) 0.5 % injection - Injection   30 mL - 5/10/2023 6:32:00 AM      Post Assessment  Injection Assessment: negative aspiration for heme, no paresthesia on injection and incremental injection  Patient Tolerance:comfortable throughout block  Complications:no

## 2023-05-11 LAB — REF LAB TEST METHOD: NORMAL

## 2023-05-17 DIAGNOSIS — Z78.9 IMPAIRED MOBILITY AND ADLS: ICD-10-CM

## 2023-05-17 DIAGNOSIS — Z74.09 IMPAIRED MOBILITY AND ADLS: ICD-10-CM

## 2023-05-17 DIAGNOSIS — Z74.09 IMPAIRED FUNCTIONAL MOBILITY, BALANCE, GAIT, AND ENDURANCE: ICD-10-CM

## 2023-05-17 DIAGNOSIS — M25.551 RIGHT HIP PAIN: ICD-10-CM

## 2023-05-17 DIAGNOSIS — I10 ESSENTIAL HYPERTENSION: ICD-10-CM

## 2023-05-17 DIAGNOSIS — M16.11 PRIMARY OSTEOARTHRITIS OF RIGHT HIP: ICD-10-CM

## 2023-05-17 RX ORDER — OXYCODONE AND ACETAMINOPHEN 7.5; 325 MG/1; MG/1
1 TABLET ORAL EVERY 8 HOURS PRN
Qty: 30 TABLET | Refills: 0 | Status: SHIPPED | OUTPATIENT
Start: 2023-05-17

## 2023-05-17 NOTE — TELEPHONE ENCOUNTER
oxyCODONE-acetaminophen (PERCOCET) 7.5-325 MG per tablet (05/10/2023)      Case Time:  Procedures:  Surgeons:    05/10/23 0740 RIGHT TOTAL HIP ARTHROPLASTY Rene Chen MD

## 2023-05-22 DIAGNOSIS — M25.551 RIGHT HIP PAIN: Primary | ICD-10-CM

## 2023-05-23 ENCOUNTER — OFFICE VISIT (OUTPATIENT)
Dept: ORTHOPEDIC SURGERY | Facility: CLINIC | Age: 58
End: 2023-05-23
Payer: COMMERCIAL

## 2023-05-23 VITALS — WEIGHT: 226 LBS | BODY MASS INDEX: 33.47 KG/M2 | HEIGHT: 69 IN

## 2023-05-23 DIAGNOSIS — Z96.641 STATUS POST TOTAL HIP REPLACEMENT, RIGHT: ICD-10-CM

## 2023-05-23 DIAGNOSIS — M16.11 PRIMARY OSTEOARTHRITIS OF RIGHT HIP: Primary | ICD-10-CM

## 2023-05-23 RX ORDER — LISINOPRIL 10 MG/1
10 TABLET ORAL DAILY
COMMUNITY
Start: 2022-10-21

## 2023-05-23 NOTE — PROGRESS NOTES
"Everette Mckeon is a 57 y.o. male is s/p  Right Total Hip Arthroplasty Anterior     Chief Complaint   Patient presents with   • Post-op     Right SWAPNA       HISTORY OF PRESENT ILLNESS: Mr. Mckeon is a 57-year-old male patient who presents today for a 2-week follow-up status post right total hip arthroplasty with anterior approach.  The patient has no unusual complaints and is progressing well.  At today's appointment, the patient has no complaints of pain in the hip joint.  Patient reports he has \"soreness only\".  The patient reports he is in participating in physical therapy at [facility] 3 times a week.  Patient reports his wife is a therapist so he is doing PT at home as well.  Patient reports mild numbness to the lateral aspect of the incision.  Denies signs of infection.  Denies signs of a DVT.  Patient reports he does not need a refill for his oxycodone at today's visit.  Patient reports mild swelling.  Sent for an x-ray of the right hip and pelvis upon arrival.       No Known Allergies      Current Outpatient Medications:   •  apixaban (ELIQUIS) 2.5 MG tablet tablet, Take 1 tablet by mouth Every 12 (Twelve) Hours for 28 doses., Disp: 28 tablet, Rfl: 0  •  [START ON 5/26/2023] aspirin 81 MG EC tablet, Take 1 tablet by mouth 2 (Two) Times a Day for 60 doses., Disp: 60 tablet, Rfl: 0  •  ferrous sulfate 324 (65 Fe) MG tablet delayed-release EC tablet, Take 1 tablet by mouth Daily With Breakfast for 30 doses., Disp: 30 tablet, Rfl: 0  •  lisinopril (PRINIVIL,ZESTRIL) 10 MG tablet, Take 1 tablet by mouth Daily., Disp: , Rfl:   •  omeprazole (priLOSEC) 40 MG capsule, Take 1 capsule by mouth Every Night., Disp: , Rfl:   •  oxyCODONE-acetaminophen (PERCOCET) 7.5-325 MG per tablet, Take 1 tablet by mouth Every 8 (Eight) Hours As Needed for Severe Pain., Disp: 30 tablet, Rfl: 0  •  sennosides-docusate (PERICOLACE) 8.6-50 MG per tablet, Take 2 tablets by mouth Every Night for 20 doses., Disp: 40 tablet, Rfl: 0  •  " tiZANidine (ZANAFLEX) 4 MG tablet, Take 1 tablet by mouth Every 8 (Eight) Hours As Needed for Muscle Spasms., Disp: 90 tablet, Rfl: 1    No fevers or chills.  No nausea or vomiting.      PHYSICAL EXAMINATION:       Everette Mckeon is a 57 y.o. male    Patient is awake and alert, answers questions appropriately and is in no apparent distress.    GAIT:     []  Normal  [x]  Antalgic    Assistive device: []  None  []  Walker     [x]  Crutches  []  Cane     []  Wheelchair  []  Stretcher    Right Hip Exam     Tenderness   The patient is experiencing no tenderness.     Muscle Strength   Abduction: 4/5   Adduction: 4/5   Flexion: 4/5     Tests   DEE: negative  Fadir:  Negative FADIR test    Other   Erythema: absent  Sensation: normal  Pulse: present    Comments:  Surgical incision healing as expected.  Well approximated.  Surrounding skin warm, dry and intact.  No pain with arc of motion.  Good range of motion.  Mild weakness noted.  No signs of infection.  Right lower extremity nontender with a negative Homans' sign.  Distal pulse palpable.                  XR Hip With or Without Pelvis 2 - 3 View Right    Result Date: 5/23/2023  Narrative: INDICATION: Pain. FINDINGS: AP pelvis and frog-leg lateral view of the right hip demonstrates a right hip prosthesis in good position. There are no complicating features evident.          ASSESSMENT:    Diagnoses and all orders for this visit:    Primary osteoarthritis of right hip    Status post total hip replacement, right    PLAN  Sent for an x-ray of the right hip and pelvis upon arrival.  Reviewed the x-ray and discussed with the patient.  The x-ray shows a stable postsurgical hardware to the right hip.  No sign of lucency.  No periprosthetic fracture.  X-ray stable.  Surgical incision care discussed with patient.  Advised he may trim the surgical mesh as it lifts.  May shower.  Do not soak in bathtub, hot tub, swimming pool or other body of water.  Reviewed signs and symptoms  of infection and a DVT.  Advised the patient he may call the office for refill of his oxycodone as needed.  Educated the patient he should continue to use the crutches if he has limping or pain.  Continue physical therapy and HEP.  Advised the patient he has no real restrictions however, should modify activity and progress as tolerated.  Reviewed dislocation precautions with the patient.  Recommended to rest, ice and elevate to control swelling and pain.  Educated the patient to continue his Eliquis until gone.  After Eliquis is completed, start aspirin 81 mg twice daily x4 weeks.  Recommend the patient to follow-up in 4 weeks.  May follow-up sooner as needed if symptoms change or worsen over new complaint.    All questions and concerns are addressed with understanding noted. They are aware and are in agreement to this plan.    Return in about 4 weeks (around 6/20/2023) for Recheck.    AGNES Massey    This document has been electronically signed by AGNES Massey on May 23, 2023 12:24 CDT      EMR Dragon/Transciption Disclaimer: Some of this note may be an electronic transcription/translation of spoken language to printed text using the Dragon Dictation System.

## 2023-06-07 RX ORDER — TIZANIDINE 4 MG/1
4 TABLET ORAL EVERY 8 HOURS PRN
Qty: 90 TABLET | Refills: 1 | Status: SHIPPED | OUTPATIENT
Start: 2023-06-07

## 2023-08-14 DIAGNOSIS — M25.551 RIGHT HIP PAIN: Primary | ICD-10-CM

## 2023-08-17 ENCOUNTER — OFFICE VISIT (OUTPATIENT)
Dept: ORTHOPEDIC SURGERY | Facility: CLINIC | Age: 58
End: 2023-08-17
Payer: COMMERCIAL

## 2023-08-17 VITALS — WEIGHT: 224.7 LBS | HEIGHT: 69 IN | BODY MASS INDEX: 33.28 KG/M2

## 2023-08-17 DIAGNOSIS — I10 ESSENTIAL HYPERTENSION: ICD-10-CM

## 2023-08-17 DIAGNOSIS — M25.551 RIGHT HIP PAIN: ICD-10-CM

## 2023-08-17 DIAGNOSIS — Z96.641 STATUS POST TOTAL HIP REPLACEMENT, RIGHT: Primary | ICD-10-CM

## 2023-08-17 NOTE — PROGRESS NOTES
"Everette Mckeon is a 57 y.o. male returns for     Chief Complaint   Patient presents with    Right Hip - Follow-up       HISTORY OF PRESENT ILLNESS:  Patient reports no new complaints.  He states he is doing very well.  He is extremely pleased with his outcome at this point.  He states that he has no pain in his hip at work.  No fevers or chills.     CONCURRENT MEDICAL HISTORY:    The following portions of the patient's history were reviewed and updated as appropriate: allergies, current medications, past family history, past medical history, past social history, past surgical history and problem list.     ROS  No fevers or chills.  No chest pain or shortness of air.  No GI or  disturbances.    PHYSICAL EXAMINATION:       Ht 175.3 cm (69\")   Wt 102 kg (224 lb 11.2 oz)   BMI 33.18 kg/mý     Physical Exam  Constitutional:       General: He is not in acute distress.     Appearance: Normal appearance.   Pulmonary:      Effort: Pulmonary effort is normal. No respiratory distress.   Neurological:      Mental Status: He is alert and oriented to person, place, and time.       GAIT:     [x]  Normal  []  Antalgic    Assistive device: [x]  None  []  Walker     []  Crutches  []  Cane     []  Wheelchair  []  Stretcher    Right Hip Exam     Comments:  Good range of motion of the right hip.  Good distal pulses and sensation.  Incision well-healed.              XR Hip With or Without Pelvis 2 - 3 View Right    Result Date: 8/17/2023  Narrative: Ordering Provider:  Rene Franklin MD Ordering Diagnosis/Indication:  Right hip pain Procedure:  XR HIP W OR WO PELVIS 2-3 VIEW RIGHT Exam Date:  8/17/23 COMPARISON:  Todays X-rays were compared to previous images dated May 23, 2023.     Impression:  AP standing of the pelvis with AP and lateral of the right hip show acceptable position and alignment of a right total hip arthroplasty.  No sign of implant loosening or failure is noted.  No interval changes noted in comparison " to prior x-ray.  A single fixation screw is into the ilium. Rene Franklin MD 8/17/23            ASSESSMENT:    Diagnoses and all orders for this visit:    Status post total hip replacement, right    Right hip pain    Essential hypertension          PLAN    Patient is 3 months status post right total hip arthroplasty.  He is doing very well.  He reports dramatic improvement from prior to surgery.  He is doing much better than he was prior to surgery.    Continue range of motion and strengthening exercises.  Slowly increase activity as tolerated.  No true restrictions.  Follow-up in 1 year postop.  Repeat x-rays on return.    Return in about 9 months (around 5/17/2024) for Recheck with repeat xrays.    Rene Franklin MD